# Patient Record
Sex: MALE | HISPANIC OR LATINO | Employment: UNEMPLOYED | URBAN - METROPOLITAN AREA
[De-identification: names, ages, dates, MRNs, and addresses within clinical notes are randomized per-mention and may not be internally consistent; named-entity substitution may affect disease eponyms.]

---

## 2017-03-08 ENCOUNTER — OFFICE VISIT (OUTPATIENT)
Dept: URGENT CARE | Facility: CLINIC | Age: 27
End: 2017-03-08
Payer: COMMERCIAL

## 2017-03-08 VITALS
OXYGEN SATURATION: 96 % | TEMPERATURE: 97.9 F | DIASTOLIC BLOOD PRESSURE: 70 MMHG | RESPIRATION RATE: 18 BRPM | HEART RATE: 92 BPM | WEIGHT: 154 LBS | SYSTOLIC BLOOD PRESSURE: 110 MMHG | BODY MASS INDEX: 22.73 KG/M2

## 2017-03-08 DIAGNOSIS — J02.9 PHARYNGITIS, UNSPECIFIED ETIOLOGY: ICD-10-CM

## 2017-03-08 DIAGNOSIS — J40 BRONCHITIS: ICD-10-CM

## 2017-03-08 DIAGNOSIS — R05.9 COUGH: ICD-10-CM

## 2017-03-08 LAB
INT CON NEG: NORMAL
INT CON POS: NORMAL
S PYO AG THROAT QL: NEGATIVE

## 2017-03-08 PROCEDURE — 99203 OFFICE O/P NEW LOW 30 MIN: CPT | Performed by: NURSE PRACTITIONER

## 2017-03-08 PROCEDURE — 87880 STREP A ASSAY W/OPTIC: CPT | Performed by: NURSE PRACTITIONER

## 2017-03-08 RX ORDER — AZITHROMYCIN 250 MG/1
TABLET, FILM COATED ORAL
Qty: 6 TAB | Refills: 0 | Status: SHIPPED | OUTPATIENT
Start: 2017-03-08 | End: 2017-07-22

## 2017-03-08 RX ORDER — ALBUTEROL SULFATE 90 UG/1
2 AEROSOL, METERED RESPIRATORY (INHALATION) EVERY 6 HOURS PRN
Qty: 8.5 G | Refills: 0 | Status: SHIPPED | OUTPATIENT
Start: 2017-03-08 | End: 2017-07-22

## 2017-03-08 ASSESSMENT — ENCOUNTER SYMPTOMS
CHILLS: 0
SPUTUM PRODUCTION: 1
SHORTNESS OF BREATH: 0
EYES NEGATIVE: 1
HEADACHES: 1
FEVER: 0
CARDIOVASCULAR NEGATIVE: 1
GASTROINTESTINAL NEGATIVE: 1
TROUBLE SWALLOWING: 0
SORE THROAT: 1
MUSCULOSKELETAL NEGATIVE: 1
COUGH: 1
SWOLLEN GLANDS: 1

## 2017-03-08 NOTE — MR AVS SNAPSHOT
Danilo Mckeon Nikki   3/8/2017 6:30 PM   Office Visit   MRN: [id number]    Department:  [facility]   Dept Phone:  [phone]    Description:  Male : 1990   Provider:  Cathey J Hamman, A.P.N.           Reason for Visit     Pharyngitis Almost a week stuffy nose , sinu pressure , few days sorethroat      Allergies as of 3/8/2017     No Known Allergies      You were diagnosed with     Pharyngitis, unspecified etiology   [8606455]       Bronchitis   [[id number]]       Cough   [786.2.ICD-9-CM]         Vital Signs     Blood Pressure Pulse Temperature Respirations Weight Oxygen Saturation    110/70 mmHg 92 36.6 °C (97.9 °F) 18 69.854 kg (154 lb) 96%    Smoking Status                   Never Smoker            Basic Information     Date Of Birth Sex Race Ethnicity Preferred Language    1990 Male  or   Origin (Trinidadian,Maldivian,Salvadorean,Clinton, etc) English      Problem List              ICD-10-CM Priority Class Noted - Resolved    Closed fracture of nasal bone S02.2XXA   2015 - Present      Health Maintenance     Patient has no pending health maintenance at this time      Results     POCT Rapid Strep A      Component    Rapid Strep Screen    NEGATIVE    Internal Control Positive    Valid    Internal Control Negative    Valid                        Current Immunizations     No immunizations on file.      Below and/or attached are the medications your provider expects you to take. Review all of your home medications and newly ordered medications with your provider and/or pharmacist. Follow medication instructions as directed by your provider and/or pharmacist. Please keep your medication list with you and share with your provider. Update the information when medications are discontinued, doses are changed, or new medications (including over-the-counter products) are added; and carry medication information at all times in the event of emergency situations     Allergies:   No Known Allergies          Medications  Valid as of: March 08, 2017 -  7:03 PM    Generic Name Brand Name Tablet Size Instructions for use    Albuterol Sulfate (Aero Soln) albuterol 108 (90 BASE) MCG/ACT Inhale 2 Puffs by mouth every 6 hours as needed for Shortness of Breath.        Azithromycin (Tab) ZITHROMAX 250 MG Take 2 pills by mouth on day one, take 1 pill by mouth on days 2-5        .                 Medicines prescribed today were sent to:     [facility] [address] [place] [address] - [address] [facility] & [address] [address]    Phone: [phone] Fax: [phone]    Open 24 Hours?: No      Medication refill instructions:       If your prescription bottle indicates you have medication refills left, it is not necessary to call your provider’s office. Please contact your pharmacy and they will refill your medication.    If your prescription bottle indicates you do not have any refills left, you may request refills at any time through one of the following ways: The online UReserv system (except Urgent Care), by calling your provider’s office, or by asking your pharmacy to contact your provider’s office with a refill request. Medication refills are processed only during regular business hours and may not be available until the next business day. Your provider may request additional information or to have a follow-up visit with you prior to refilling your medication.   *Please Note: Medication refills are assigned a new Rx number when refilled electronically. Your pharmacy may indicate that no refills were authorized even though a new prescription for the same medication is available at the pharmacy. Please request the medicine by name with the pharmacy before contacting your provider for a refill.           UReserv Access Code: [email or web address]  Expires: 4/7/2017  7:03 PM    UReserv  A secure, online tool to manage your health information     [employer]’s  Firm58® is a secure, online tool that connects you to your personalized health information from the privacy of your home -- day or night - making it very easy for you to manage your healthcare. Once the activation process is completed, you can even access your medical information using the Firm58 norma, which is available for free in the Apple Norma store or Google Play store.     Firm58 provides the following levels of access (as shown below):   My Chart Features   [facility] Doctor [facility]  Specialists [facility]  Urgent  Care Non-[facility]  Primary Care  Doctor   Email your healthcare team securely and privately 24/7 X X X    Manage appointments: schedule your next appointment; view details of past/upcoming appointments X      Request prescription refills. X      View recent personal medical records, including lab and immunizations X X X X   View health record, including health history, allergies, medications X X X X   Read reports about your outpatient visits, procedures, consult and ER notes X X X X   See your discharge summary, which is a recap of your hospital and/or ER visit that includes your diagnosis, lab results, and care plan. X X       How to register for Firm58:  1. Go to  [email or web address].  2. Click on the Sign Up Now box, which takes you to the New Member Sign Up page. You will need to provide the following information:  a. Enter your Firm58 Access Code exactly as it appears at the top of this page. (You will not need to use this code after you’ve completed the sign-up process. If you do not sign up before the expiration date, you must request a new code.)   b. Enter your date of birth.   c. Enter your home email address.   d. Click Submit, and follow the next screen’s instructions.  3. Create a Firm58 ID. This will be your Firm58 login ID and cannot be changed, so think of one that is secure and easy to remember.  4. Create a Firm58 password. You can change your password at any  time.  5. Enter your Password Reset Question and Answer. This can be used at a later time if you forget your password.   6. Enter your e-mail address. This allows you to receive e-mail notifications when new information is available in [employer].  7. Click Sign Up. You can now view your health information.    For assistance activating your [employer] account, call [phone]

## 2017-03-09 NOTE — PROGRESS NOTES
Subjective:      Danilo Jordan is a 26 y.o. male who presents with Pharyngitis    PMH: no history of chronic illness    Social hx: non-smoker    Family hx: not pertinent to today's visit    This patient is a 26-year-old male who presents today with complaint of 4 weeks of intermittent cough, nasal congestion, sinus pressure, and intermittent sore throat. States he has at times had chest tightness and a deep cough. He is not a smoker and does not have any secondhand smoke exposure. He denies any fever, aches, or chills. He denies any shortness of breath.          Pharyngitis   This is a new problem. The current episode started in the past 7 days. Neither side of throat is experiencing more pain than the other. There has been no fever. Associated symptoms include congestion, coughing, headaches and swollen glands. Pertinent negatives include no shortness of breath or trouble swallowing. He has tried nothing for the symptoms. The treatment provided no relief.       Review of Systems   Constitutional: Positive for malaise/fatigue. Negative for fever and chills.   HENT: Positive for congestion and sore throat. Negative for trouble swallowing.    Eyes: Negative.    Respiratory: Positive for cough and sputum production. Negative for shortness of breath.    Cardiovascular: Negative.    Gastrointestinal: Negative.    Genitourinary: Negative.    Musculoskeletal: Negative.    Skin: Negative.    Neurological: Positive for headaches.          Objective:     /70 mmHg  Pulse 92  Temp(Src) 36.6 °C (97.9 °F)  Resp 18  Wt 69.854 kg (154 lb)  SpO2 96%     Physical Exam   Constitutional: He is oriented to person, place, and time. He appears well-developed and well-nourished. No distress.   HENT:   Right Ear: External ear normal.   Left Ear: External ear normal.   Mouth/Throat: No oropharyngeal exudate.   Left TM slightly red   Eyes: Conjunctivae and EOM are normal. Pupils are equal, round, and reactive to  light.   Neck: Normal range of motion. Neck supple.   Cardiovascular: Normal rate and regular rhythm.    Pulmonary/Chest:   Breath sounds harsh   Neurological: He is alert and oriented to person, place, and time.   Skin: Skin is warm and dry. He is not diaphoretic.   Psychiatric: He has a normal mood and affect. His behavior is normal.   Vitals reviewed.              Assessment/Plan:     1. Pharyngitis, unspecified etiology    - POCT Rapid Strep A; negative    2. Bronchitis    - azithromycin (ZITHROMAX) 250 MG Tab; Take 2 pills by mouth on day one, take 1 pill by mouth on days 2-5  Dispense: 6 Tab; Refill: 0  - albuterol 108 (90 BASE) MCG/ACT Aero Soln inhalation aerosol; Inhale 2 Puffs by mouth every 6 hours as needed for Shortness of Breath.  Dispense: 8.5 g; Refill: 0  -Follow up if symptoms persist or worsen    3. Cough    - albuterol 108 (90 BASE) MCG/ACT Aero Soln inhalation aerosol; Inhale 2 Puffs by mouth every 6 hours as needed for Shortness of Breath.  Dispense: 8.5 g; Refill: 0

## 2017-03-21 ENCOUNTER — HOSPITAL ENCOUNTER (OUTPATIENT)
Facility: MEDICAL CENTER | Age: 27
End: 2017-03-21
Attending: NURSE PRACTITIONER
Payer: COMMERCIAL

## 2017-03-21 ENCOUNTER — OFFICE VISIT (OUTPATIENT)
Dept: URGENT CARE | Facility: CLINIC | Age: 27
End: 2017-03-21
Payer: COMMERCIAL

## 2017-03-21 VITALS
RESPIRATION RATE: 20 BRPM | SYSTOLIC BLOOD PRESSURE: 120 MMHG | OXYGEN SATURATION: 96 % | DIASTOLIC BLOOD PRESSURE: 80 MMHG | HEART RATE: 78 BPM | TEMPERATURE: 98.6 F

## 2017-03-21 DIAGNOSIS — Z20.2 EXPOSURE TO STD: ICD-10-CM

## 2017-03-21 PROCEDURE — 87491 CHLMYD TRACH DNA AMP PROBE: CPT

## 2017-03-21 PROCEDURE — 87591 N.GONORRHOEAE DNA AMP PROB: CPT

## 2017-03-21 PROCEDURE — 99000 SPECIMEN HANDLING OFFICE-LAB: CPT | Performed by: NURSE PRACTITIONER

## 2017-03-21 PROCEDURE — 99213 OFFICE O/P EST LOW 20 MIN: CPT | Performed by: NURSE PRACTITIONER

## 2017-03-21 RX ORDER — CEFTRIAXONE SODIUM 250 MG/1
250 INJECTION, POWDER, FOR SOLUTION INTRAMUSCULAR; INTRAVENOUS ONCE
Status: COMPLETED | OUTPATIENT
Start: 2017-03-21 | End: 2017-03-21

## 2017-03-21 RX ORDER — AZITHROMYCIN 500 MG/1
1000 TABLET, FILM COATED ORAL ONCE
Qty: 2 TAB | Refills: 0 | Status: SHIPPED | OUTPATIENT
Start: 2017-03-21 | End: 2017-03-21

## 2017-03-21 RX ADMIN — CEFTRIAXONE SODIUM 250 MG: 250 INJECTION, POWDER, FOR SOLUTION INTRAMUSCULAR; INTRAVENOUS at 19:13

## 2017-03-21 ASSESSMENT — ENCOUNTER SYMPTOMS: CONSTITUTIONAL NEGATIVE: 1

## 2017-03-22 LAB
C TRACH DNA GENITAL QL NAA+PROBE: POSITIVE
N GONORRHOEA DNA GENITAL QL NAA+PROBE: NEGATIVE
SPECIMEN SOURCE: ABNORMAL

## 2017-03-23 ENCOUNTER — TELEPHONE (OUTPATIENT)
Dept: URGENT CARE | Facility: CLINIC | Age: 27
End: 2017-03-23

## 2017-03-23 ENCOUNTER — HOSPITAL ENCOUNTER (OUTPATIENT)
Dept: LAB | Facility: MEDICAL CENTER | Age: 27
End: 2017-03-23
Attending: NURSE PRACTITIONER
Payer: COMMERCIAL

## 2017-03-23 DIAGNOSIS — Z20.2 EXPOSURE TO STD: ICD-10-CM

## 2017-03-23 LAB
HIV 1+2 AB+HIV1 P24 AG SERPL QL IA: NON REACTIVE
TREPONEMA PALLIDUM IGG+IGM AB [PRESENCE] IN SERUM OR PLASMA BY IMMUNOASSAY: NON REACTIVE

## 2017-03-23 PROCEDURE — 87389 HIV-1 AG W/HIV-1&-2 AB AG IA: CPT

## 2017-03-23 PROCEDURE — 86694 HERPES SIMPLEX NES ANTBDY: CPT

## 2017-03-23 PROCEDURE — 36415 COLL VENOUS BLD VENIPUNCTURE: CPT

## 2017-03-23 PROCEDURE — 86780 TREPONEMA PALLIDUM: CPT

## 2017-03-23 NOTE — TELEPHONE ENCOUNTER
Patient notified by phone of positive chlamydia result. States he has not yet taken the zithromax as it was not at the pharmacy. I did verify that it was sent and I requested that the patient check the pharmacy again and notify me if  The RX is not there. Patient states he will do this. No other questions at this time.

## 2017-03-25 LAB
HSV1+2 IGG SER IA-ACNC: >22.4 IV
HSV1+2 IGM SER IA-ACNC: 0.58 IV

## 2017-03-28 ENCOUNTER — TELEPHONE (OUTPATIENT)
Dept: URGENT CARE | Facility: CLINIC | Age: 27
End: 2017-03-28

## 2017-03-28 NOTE — TELEPHONE ENCOUNTER
Pt. Called and would like to speak to you personally he says it is of a personal nature. His cell is [phone]

## 2017-04-02 ENCOUNTER — TELEPHONE (OUTPATIENT)
Dept: URGENT CARE | Facility: CLINIC | Age: 27
End: 2017-04-02

## 2017-04-03 NOTE — TELEPHONE ENCOUNTER
Attempted to notify patient of results by phone. No answer. Left voice message advising patient that I do need to discuss a positive result with him. Advised also that I will attempt to call him again on 4/4/17 with results. Patient is not registered for [employer].

## 2017-04-14 ENCOUNTER — TELEPHONE (OUTPATIENT)
Dept: URGENT CARE | Facility: CLINIC | Age: 27
End: 2017-04-14

## 2017-04-14 NOTE — TELEPHONE ENCOUNTER
Patient return phone call regarding his labs. I discussed all lab results with him. He has no further questions at this time. Patient was advised to call or return to office for any further questions or concerns.

## 2017-05-31 ENCOUNTER — HOSPITAL ENCOUNTER (OUTPATIENT)
Facility: MEDICAL CENTER | Age: 27
End: 2017-05-31
Attending: FAMILY MEDICINE
Payer: COMMERCIAL

## 2017-05-31 ENCOUNTER — OFFICE VISIT (OUTPATIENT)
Dept: URGENT CARE | Facility: CLINIC | Age: 27
End: 2017-05-31
Payer: COMMERCIAL

## 2017-05-31 VITALS
SYSTOLIC BLOOD PRESSURE: 120 MMHG | OXYGEN SATURATION: 98 % | WEIGHT: 155 LBS | RESPIRATION RATE: 20 BRPM | DIASTOLIC BLOOD PRESSURE: 80 MMHG | TEMPERATURE: 98.2 F | HEART RATE: 72 BPM | BODY MASS INDEX: 22.88 KG/M2

## 2017-05-31 DIAGNOSIS — A74.9 CHLAMYDIA: ICD-10-CM

## 2017-05-31 PROCEDURE — 99213 OFFICE O/P EST LOW 20 MIN: CPT | Performed by: FAMILY MEDICINE

## 2017-05-31 PROCEDURE — 87591 N.GONORRHOEAE DNA AMP PROB: CPT

## 2017-05-31 PROCEDURE — 99000 SPECIMEN HANDLING OFFICE-LAB: CPT | Performed by: FAMILY MEDICINE

## 2017-05-31 PROCEDURE — 87491 CHLMYD TRACH DNA AMP PROBE: CPT

## 2017-05-31 ASSESSMENT — ENCOUNTER SYMPTOMS
FLANK PAIN: 0
FEVER: 0

## 2017-05-31 NOTE — MR AVS SNAPSHOT
Danilo Mckeon Nikki   2017 6:45 PM   Office Visit   MRN: [id number]    Department:  [facility]   Dept Phone:  [phone]    Description:  Male : 1990   Provider:  Leonides Silva M.D.           Reason for Visit     Exposure to STD           Allergies as of 2017     No Known Allergies      You were diagnosed with     Chlamydia   [230269]         Vital Signs     Blood Pressure Pulse Temperature Respirations Weight Oxygen Saturation    120/80 mmHg 72 36.8 °C (98.2 °F) 20 70.308 kg (155 lb) 98%    Smoking Status                   Never Smoker            Basic Information     Date Of Birth Sex Race Ethnicity Preferred Language    1990 Male  or   Origin (Malay,Malawian,Trinidadian,Clinton, etc) English      Problem List              ICD-10-CM Priority Class Noted - Resolved    Closed fracture of nasal bone S02.2XXA   2015 - Present      Health Maintenance        Date Due Completion Dates    IMM HEP B VACCINE (1 of 3 - Primary Series) 1992 ---    IMM HEP A VACCINE (1 of 2 - Standard Series) 1993 ---    IMM HPV VACCINE (1 of 3 - Male 3 Dose Series) 2003 ---    IMM VARICELLA (CHICKENPOX) VACCINE (1 of 2 - 2 Dose Adolescent Series) 2005 ---    IMM DTaP/Tdap/Td Vaccine (1 - Tdap) 2011 ---            Current Immunizations     No immunizations on file.      Below and/or attached are the medications your provider expects you to take. Review all of your home medications and newly ordered medications with your provider and/or pharmacist. Follow medication instructions as directed by your provider and/or pharmacist. Please keep your medication list with you and share with your provider. Update the information when medications are discontinued, doses are changed, or new medications (including over-the-counter products) are added; and carry medication information at all times in the event of emergency situations     Allergies:  No  Known Allergies          Medications  Valid as of: May 31, 2017 -  7:26 PM    Generic Name Brand Name Tablet Size Instructions for use    Albuterol Sulfate (Aero Soln) albuterol 108 (90 BASE) MCG/ACT Inhale 2 Puffs by mouth every 6 hours as needed for Shortness of Breath.        Azithromycin (Tab) ZITHROMAX 250 MG Take 2 pills by mouth on day one, take 1 pill by mouth on days 2-5        .                 Medicines prescribed today were sent to:     [facility] [address] [place] [address], [place] - [address] [facility] & [address] [place] [address]    Phone: [phone] Fax: [phone]    Open 24 Hours?: No      Medication refill instructions:       If your prescription bottle indicates you have medication refills left, it is not necessary to call your provider’s office. Please contact your pharmacy and they will refill your medication.    If your prescription bottle indicates you do not have any refills left, you may request refills at any time through one of the following ways: The online 265 [employer] system (except Urgent Care), by calling your provider’s office, or by asking your pharmacy to contact your provider’s office with a refill request. Medication refills are processed only during regular business hours and may not be available until the next business day. Your provider may request additional information or to have a follow-up visit with you prior to refilling your medication.   *Please Note: Medication refills are assigned a new Rx number when refilled electronically. Your pharmacy may indicate that no refills were authorized even though a new prescription for the same medication is available at the pharmacy. Please request the medicine by name with the pharmacy before contacting your provider for a refill.        Your To Do List     Future Labs/Procedures Complete By Expires    CHLAMYDIA/GC PCR URINE OR SWAB  As directed 5/31/2018         265 Network Access Code:  [email or web address]  Expires: 6/28/2017  4:04 AM    Skai  A secure, online tool to manage your health information     [employer]’s Skai® is a secure, online tool that connects you to your personalized health information from the privacy of your home -- day or night - making it very easy for you to manage your healthcare. Once the activation process is completed, you can even access your medical information using the Skai norma, which is available for free in the Apple Norma store or Google Play store.     Skai provides the following levels of access (as shown below):   My Chart Features   [facility] Primary Care Doctor [facility]  Specialists [facility]  Urgent  Care Non-[facility]  Primary Care  Doctor   Email your healthcare team securely and privately 24/7 X X X    Manage appointments: schedule your next appointment; view details of past/upcoming appointments X      Request prescription refills. X      View recent personal medical records, including lab and immunizations X X X X   View health record, including health history, allergies, medications X X X X   Read reports about your outpatient visits, procedures, consult and ER notes X X X X   See your discharge summary, which is a recap of your hospital and/or ER visit that includes your diagnosis, lab results, and care plan. X X       How to register for Skai:  1. Go to  [email or web address].  2. Click on the Sign Up Now box, which takes you to the New Member Sign Up page. You will need to provide the following information:  a. Enter your Skai Access Code exactly as it appears at the top of this page. (You will not need to use this code after you’ve completed the sign-up process. If you do not sign up before the expiration date, you must request a new code.)   b. Enter your date of birth.   c. Enter your home email address.   d. Click Submit, and follow the next screen’s instructions.  3. Create a Skai ID. This will be your Skai login ID and cannot be  changed, so think of one that is secure and easy to remember.  4. Create a Placed password. You can change your password at any time.  5. Enter your Password Reset Question and Answer. This can be used at a later time if you forget your password.   6. Enter your e-mail address. This allows you to receive e-mail notifications when new information is available in Placed.  7. Click Sign Up. You can now view your health information.    For assistance activating your Placed account, call [phone]

## 2017-06-01 LAB
C TRACH DNA SPEC QL NAA+PROBE: POSITIVE
N GONORRHOEA DNA SPEC QL NAA+PROBE: NEGATIVE
SPECIMEN SOURCE: ABNORMAL

## 2017-06-01 NOTE — PROGRESS NOTES
Subjective:      Danilo Jordan is a 26 y.o. male who presents with Exposure to STD            Exposure to STD  Pertinent negatives include no fever.         Here for f/u.      Was diagnosed with chlamydia in march.   He was treated appropriately.   Denies any symptoms, currently.   Just wants to ensure that chlamydia is gone    Past medical history was unremarkable and not pertinent to current issue  Social hx - denies tobacco, alcohol, drug use         Review of Systems   Constitutional: Negative for fever.   Genitourinary: Negative for dysuria, urgency, frequency, hematuria and flank pain.   All other systems reviewed and are negative.         Objective:     /80 mmHg  Pulse 72  Temp(Src) 36.8 °C (98.2 °F)  Resp 20  Wt 70.308 kg (155 lb)  SpO2 98%     Physical Exam   Constitutional: He is oriented to person, place, and time. He appears well-developed. No distress.   HENT:   Head: Normocephalic and atraumatic.   Eyes: Conjunctivae are normal.   Cardiovascular: Normal rate.    Pulmonary/Chest: Effort normal.   Neurological: He is alert and oriented to person, place, and time.   Skin: Skin is warm. He is not diaphoretic. No erythema.   Psychiatric: His behavior is normal.   Nursing note and vitals reviewed.              Assessment/Plan:     1. Chlamydia  Culture ordered to ensure resolution      - CHLAMYDIA/GC PCR URINE OR SWAB; Future

## 2017-06-02 ENCOUNTER — NON-PROVIDER VISIT (OUTPATIENT)
Dept: URGENT CARE | Facility: PHYSICIAN GROUP | Age: 27
End: 2017-06-02

## 2017-06-02 DIAGNOSIS — Z02.1 PRE-EMPLOYMENT DRUG SCREENING: ICD-10-CM

## 2017-06-02 LAB
AMP AMPHETAMINE: NORMAL
COC COCAINE: NORMAL
INT CON NEG: NEGATIVE
INT CON POS: POSITIVE
MET METHAMPHETAMINES: NORMAL
OPI OPIATES: NORMAL
PCP PHENCYCLIDINE: NORMAL
POC DRUG COMMENT 753798-OCCUPATIONAL HEALTH: NORMAL
THC: NORMAL

## 2017-06-02 PROCEDURE — 80305 DRUG TEST PRSMV DIR OPT OBS: CPT | Performed by: FAMILY MEDICINE

## 2017-06-02 NOTE — PROGRESS NOTES
Danilo Jordan is a 26 y.o. male here for a non-provider visit for UDS    If abnormal was an in office provider notified today (if so, indicate provider)? No  Routed to PCP? No

## 2017-06-02 NOTE — MR AVS SNAPSHOT
Danilo RiosNarcisa   2017 3:30 PM   Non-Provider Visit   MRN: [id number]    Department:  [facility]   Dept Phone:  [phone]    Description:  Male : 1990   Provider:  [facility]           Reason for Visit     Drug / Alcohol Assessment           Allergies as of 2017     No Known Allergies      You were diagnosed with     Pre-employment drug screening   [[id number]]         Vital Signs     Smoking Status                   Never Smoker            Basic Information     Date Of Birth Sex Race Ethnicity Preferred Language    1990 Male  or   Origin (Estonian,Czech,Romanian,Paraguayan, etc) English      Problem List              ICD-10-CM Priority Class Noted - Resolved    Closed fracture of nasal bone S02.2XXA   2015 - Present      Health Maintenance        Date Due Completion Dates    IMM HEP B VACCINE (1 of 3 - Primary Series) 1992 ---    IMM HEP A VACCINE (1 of 2 - Standard Series) 1993 ---    IMM HPV VACCINE (1 of 3 - Male 3 Dose Series) 2003 ---    IMM VARICELLA (CHICKENPOX) VACCINE (1 of 2 - 2 Dose Adolescent Series) 2005 ---    IMM DTaP/Tdap/Td Vaccine (1 - Tdap) 2011 ---            Results     POCT 6 Panel Urine Drug Screen      Component    AMPHETAMINE    POC THC    COCAINE    OPIATES    PHENCYCLIDINE    METHAMPHETAMINES    POC Urine Drug Screen Comment    NEG    Internal Control Positive    Positive    Internal Control Negative    Negative                        Current Immunizations     No immunizations on file.      Below and/or attached are the medications your provider expects you to take. Review all of your home medications and newly ordered medications with your provider and/or pharmacist. Follow medication instructions as directed by your provider and/or pharmacist. Please keep your medication list with you and share with your provider. Update the information when medications are discontinued, doses are  changed, or new medications (including over-the-counter products) are added; and carry medication information at all times in the event of emergency situations     Allergies:  No Known Allergies          Medications  Valid as of: June 02, 2017 -  6:08 PM    Generic Name Brand Name Tablet Size Instructions for use    Albuterol Sulfate (Aero Soln) albuterol 108 (90 BASE) MCG/ACT Inhale 2 Puffs by mouth every 6 hours as needed for Shortness of Breath.        Azithromycin (Tab) ZITHROMAX 250 MG Take 2 pills by mouth on day one, take 1 pill by mouth on days 2-5        .                 Medicines prescribed today were sent to:     [facility] [address] [place] [address], [place] - [address] [facility] [address] [facility]    [address] [address]    Phone: [phone] Fax: [phone]    Open 24 Hours?: No      Medication refill instructions:       If your prescription bottle indicates you have medication refills left, it is not necessary to call your provider’s office. Please contact your pharmacy and they will refill your medication.    If your prescription bottle indicates you do not have any refills left, you may request refills at any time through one of the following ways: The online WeVideo system (except Urgent Care), by calling your provider’s office, or by asking your pharmacy to contact your provider’s office with a refill request. Medication refills are processed only during regular business hours and may not be available until the next business day. Your provider may request additional information or to have a follow-up visit with you prior to refilling your medication.   *Please Note: Medication refills are assigned a new Rx number when refilled electronically. Your pharmacy may indicate that no refills were authorized even though a new prescription for the same medication is available at the pharmacy. Please request the medicine by name with the pharmacy before contacting your provider for a  refill.           TearSolutions Access Code: [email or web address]  Expires: 6/28/2017  4:04 AM    TearSolutions  A secure, online tool to manage your health information     [employer] TearSolutions® is a secure, online tool that connects you to your personalized health information from the privacy of your home -- day or night - making it very easy for you to manage your healthcare. Once the activation process is completed, you can even access your medical information using the TearSolutions norma, which is available for free in the Apple Norma store or Google Play store.     TearSolutions provides the following levels of access (as shown below):   My Chart Features   [facility] Primary Care Doctor [facility]  Specialists [facility]  Urgent  Care Non-[facility]  Primary Care  Doctor   Email your healthcare team securely and privately 24/7 X X X    Manage appointments: schedule your next appointment; view details of past/upcoming appointments X      Request prescription refills. X      View recent personal medical records, including lab and immunizations X X X X   View health record, including health history, allergies, medications X X X X   Read reports about your outpatient visits, procedures, consult and ER notes X X X X   See your discharge summary, which is a recap of your hospital and/or ER visit that includes your diagnosis, lab results, and care plan. X X       How to register for TearSolutions:  1. Go to  [email or web address].  2. Click on the Sign Up Now box, which takes you to the New Member Sign Up page. You will need to provide the following information:  a. Enter your TearSolutions Access Code exactly as it appears at the top of this page. (You will not need to use this code after you’ve completed the sign-up process. If you do not sign up before the expiration date, you must request a new code.)   b. Enter your date of birth.   c. Enter your home email address.   d. Click Submit, and follow the next screen’s instructions.  3. Create a TearSolutions ID. This will  be your CrowdWorks login ID and cannot be changed, so think of one that is secure and easy to remember.  4. Create a CrowdWorks password. You can change your password at any time.  5. Enter your Password Reset Question and Answer. This can be used at a later time if you forget your password.   6. Enter your e-mail address. This allows you to receive e-mail notifications when new information is available in CrowdWorks.  7. Click Sign Up. You can now view your health information.    For assistance activating your CrowdWorks account, call [phone]

## 2017-06-05 DIAGNOSIS — A74.9 CHLAMYDIA: ICD-10-CM

## 2017-06-05 RX ORDER — AZITHROMYCIN 500 MG/1
500 TABLET, FILM COATED ORAL ONCE
Qty: 1 TAB | Refills: 0 | Status: SHIPPED | OUTPATIENT
Start: 2017-06-05 | End: 2017-06-05

## 2017-07-22 ENCOUNTER — HOSPITAL ENCOUNTER (EMERGENCY)
Facility: MEDICAL CENTER | Age: 27
End: 2017-07-22
Attending: EMERGENCY MEDICINE
Payer: COMMERCIAL

## 2017-07-22 VITALS
HEART RATE: 74 BPM | BODY MASS INDEX: 22.33 KG/M2 | SYSTOLIC BLOOD PRESSURE: 120 MMHG | HEIGHT: 69 IN | DIASTOLIC BLOOD PRESSURE: 81 MMHG | RESPIRATION RATE: 16 BRPM | WEIGHT: 150.79 LBS | TEMPERATURE: 98.5 F | OXYGEN SATURATION: 96 %

## 2017-07-22 DIAGNOSIS — A74.9 CHLAMYDIA: ICD-10-CM

## 2017-07-22 LAB — TREPONEMA PALLIDUM IGG+IGM AB [PRESENCE] IN SERUM OR PLASMA BY IMMUNOASSAY: NON REACTIVE

## 2017-07-22 PROCEDURE — 99284 EMERGENCY DEPT VISIT MOD MDM: CPT

## 2017-07-22 PROCEDURE — 86780 TREPONEMA PALLIDUM: CPT

## 2017-07-22 PROCEDURE — 87535 HIV-1 PROBE&REVERSE TRNSCRPJ: CPT

## 2017-07-22 PROCEDURE — A9270 NON-COVERED ITEM OR SERVICE: HCPCS | Performed by: EMERGENCY MEDICINE

## 2017-07-22 PROCEDURE — 700102 HCHG RX REV CODE 250 W/ 637 OVERRIDE(OP): Performed by: EMERGENCY MEDICINE

## 2017-07-22 RX ORDER — AZITHROMYCIN 250 MG/1
1000 TABLET, FILM COATED ORAL ONCE
Status: COMPLETED | OUTPATIENT
Start: 2017-07-22 | End: 2017-07-22

## 2017-07-22 RX ADMIN — AZITHROMYCIN 1000 MG: 250 TABLET, FILM COATED ORAL at 12:22

## 2017-07-22 ASSESSMENT — PAIN SCALES - GENERAL: PAINLEVEL_OUTOF10: 4

## 2017-07-22 NOTE — ED NOTES
"Chief Complaint   Patient presents with   • Diarrhea     x 3 weeks   • Abdominal Pain     x 3 weeks     /81 mmHg  Pulse 74  Temp(Src) 36.9 °C (98.5 °F)  Resp 16  Ht 1.753 m (5' 9\")  Wt 68.4 kg (150 lb 12.7 oz)  BMI 22.26 kg/m2  SpO2 96%    "

## 2017-07-22 NOTE — ED AVS SNAPSHOT
7/22/2017    Danilo Jordan  [address]  [place] [address]    Dear Danilo Mckeon:    [facility] wants to ensure your discharge home is safe and you or your loved ones have had all of your questions answered regarding your care after you leave the hospital.    Below is a list of resources and contact information should you have any questions regarding your hospital stay, follow-up instructions, or active medical symptoms.    Questions or Concerns Regarding… Contact   Medical Questions Related to Your Discharge  (7 days a week, 8am-5pm) Contact a Nurse Care Coordinator   [phone]   Medical Questions Not Related to Your Discharge  (24 hours a day / 7 days a week)  Contact the Nurse Health Line   [phone]    Medications or Discharge Instructions Refer to your discharge packet   or contact your [facility] Primary Care Provider   [phone]   Follow-up Appointment(s) Schedule your appointment via [employer]   or contact Scheduling [phone]   Billing Review your statement via [employer]  or contact Billing [phone]   Medical Records Review your records via [employer]   or contact Medical Records [phone]     You may receive a telephone call within two days of discharge. This call is to make certain you understand your discharge instructions and have the opportunity to have any questions answered. You can also easily access your medical information, test results and upcoming appointments via the [employer] free online health management tool. You can learn more and sign up at [employer]. For assistance setting up your [employer] account, please call [phone].    Once again, we want to ensure your discharge home is safe and that you have a clear understanding of any next steps in your care. If you have any questions or concerns, please do not hesitate to contact us, we are here for you. Thank you for choosing [facility] for your healthcare needs.    Sincerely,    Your [facility] Healthcare Team

## 2017-07-22 NOTE — ED AVS SNAPSHOT
BioVentrix Access Code: [email or web address]  Expires: 7/27/2017  4:08 AM    Your email address is not on file at [facility].  Email Addresses are required for you to sign up for BioVentrix, please contact [phone] to verify your personal information and to provide your email address prior to attempting to register for BioVentrix.    Danilo Jordan  [address]  [place] [address]    BioVentrix  A secure, online tool to manage your health information     [employer]’s BioVentrix® is a secure, online tool that connects you to your personalized health information from the privacy of your home -- day or night - making it very easy for you to manage your healthcare. Once the activation process is completed, you can even access your medical information using the BioVentrix norma, which is available for free in the Apple Norma store or Google Play store.     To learn more about BioVentrix, visit [email or web address]    There are two levels of access available (as shown below):   My Chart Features  [facility] Primary Care Doctor [facility]  Specialists [facility]  Urgent  Care Non-[facility] Primary Care Doctor   Email your healthcare team securely and privately 24/7 X X X    Manage appointments: schedule your next appointment; view details of past/upcoming appointments X      Request prescription refills. X      View recent personal medical records, including lab and immunizations X X X X   View health record, including health history, allergies, medications X X X X   Read reports about your outpatient visits, procedures, consult and ER notes X X X X   See your discharge summary, which is a recap of your hospital and/or ER visit that includes your diagnosis, lab results, and care plan X X  X     How to register for BioVentrix:  Once your e-mail address has been verified, follow the following steps to sign up for [email or web address].     1. Go to  [email or web address]  2. Click on the Sign Up Now box, which takes you to the New Member Sign Up  page. You will need to provide the following information:  a. Enter your [employer] Access Code exactly as it appears at the top of this page. (You will not need to use this code after you’ve completed the sign-up process. If you do not sign up before the expiration date, you must request a new code.)   b. Enter your date of birth.   c. Enter your home email address.   d. Click Submit, and follow the next screen’s instructions.  3. Create a [employer] ID. This will be your [employer] login ID and cannot be changed, so think of one that is secure and easy to remember.  4. Create a [employer] password. You can change your password at any time.  5. Enter your Password Reset Question and Answer. This can be used at a later time if you forget your password.   6. Enter your e-mail address. This allows you to receive e-mail notifications when new information is available in [employer].  7. Click Sign Up. You can now view your health information.    For assistance activating your [employer] account, call [phone]

## 2017-07-22 NOTE — ED AVS SNAPSHOT
Home Care Instructions                                                                                                                Danilo Jordan   MRN: [id number]    Department:  [facility], Emergency Dept   Date of Visit:  7/22/2017            [facility] Emergency Dept    [address]    [place] [address]    Phone:  [phone]      You were seen by     Hillary Naik M.D.      Your Diagnosis Was     Chlamydia     A74.9       These are the medications you received during your hospitalization from 07/22/2017 1055 to 07/22/2017 1230     Date/Time Order Dose Route Action    07/22/2017 1222 azithromycin (ZITHROMAX) tablet 1,000 mg 1,000 mg Oral Given      Follow-up Information     1. Follow up with [facility], Emergency Dept.    Specialty:  Emergency Medicine    Why:  Return for worsening pain, fever, vomiting or other concerns    Contact information    [address] [address] [phone]        2. Follow up with [facility].    Why:  for STD testing as needed    Contact information    [address]   [place] [address]  [phone]        Medication Information     Review all of your home medications and newly ordered medications with your primary doctor and/or pharmacist as soon as possible. Follow medication instructions as directed by your doctor and/or pharmacist.     Please keep your complete medication list with you and share with your physician. Update the information when medications are discontinued, doses are changed, or new medications (including over-the-counter products) are added; and carry medication information at all times in the event of emergency situations.               Medication List      Notice     You have not been prescribed any medications.            Procedures and tests performed during your visit     HIV-1 (RNA AND DNA) BY PCR, QUAL    T.PALLIDUM AB EIA         Discharge Instructions       Chlamydia, Male  Chlamydia is an infection. It is spread through sexual contact. Chlamydia can be in different areas of the body. These areas include the urethra, throat, or rectum. It is important to treat chlamydia as soon as possible. It can damage other organs.   CAUSES   Chlamydia is caused by bacteria. It is a sexually transmitted disease. This means that it is passed from an infected partner during intimate contact. This contact could be with the genitals, mouth, or rectal area.   SIGNS AND SYMPTOMS   There may not be any symptoms. This is often the case early in the infection. If there are symptoms, they are usually mild and may only be noticeable in the morning. Symptoms you may notice include:   · Burning with urination.  · Pain or swelling in the testicles.  · Watery mucus-like discharge from the penis.  · Long-standing (chronic) pelvic pain after frequent infections.  · Pain, swelling, or itching around the anus.  · A sore throat.  · Itching, burning, or redness in the eyes, or discharge from the eyes.  DIAGNOSIS   To diagnose this infection, your health care provider will do a pelvic exam. A sample of urine or a swab from the rectum may be taken for testing.   TREATMENT   Chlamydia is treated with antibiotic medicines. Your health care provider may test you for infection again 3 months after treatment.  HOME CARE INSTRUCTIONS  · Take your antibiotic medicine as directed by your health care provider. Finish the antibiotic even if you start to feel better. Incomplete treatment will put you at risk for not being able to have children (sterility).    · Take medicines only as directed by your health care provider.    · Rest.    · Inform any sexual partners about your infection. Even if they are symptom free or have a negative culture or evaluation, they should be treated for the condition.    · Do not have sex (intercourse) until treatment is completed and your health care  provider says it is okay.    · Keep all follow-up visits as directed by your health care provider.    · Not all test results are available during your visit. If your test results are not back during the visit, make an appointment with your health care provider to find out the results. Do not assume everything is normal if you have not heard from your health care provider or the medical facility. It is your responsibility to get your test results.  SEEK MEDICAL CARE IF:  · You develop new joint pain.  · You have a fever.  SEEK IMMEDIATE MEDICAL CARE IF:   · Your pain increases.    · You have abnormal discharge.    · You have pain during intercourse.  MAKE SURE YOU:   · Understand these instructions.  · Will watch your condition.  · Will get help right away if you are not doing well or get worse.     This information is not intended to replace advice given to you by your health care provider. Make sure you discuss any questions you have with your health care provider.     Document Released: 12/18/2006 Document Revised: 01/08/2016 Document Reviewed: 06/26/2014  [employer] Interactive Patient Education ©2016 [employer]            Patient Information     Patient Information    Following emergency treatment: all patient requiring follow-up care must return either to a private physician or a clinic if your condition worsens before you are able to obtain further medical attention, please return to the emergency room.     Billing Information    At [facility], we work to make the billing process streamlined for our patients.  Our Representatives are here to answer any questions you may have regarding your hospital bill.  If you have insurance coverage and have supplied your insurance information to us, we will submit a claim to your insurer on your behalf.  Should you have any questions regarding your bill, we can be reached online or by phone as follows:  Online: You are able pay your bills online or live chat with our  representatives about any billing questions you may have. We are here to help Monday - Friday from 8:00am to 7:30pm and 9:00am - 12:00pm on Saturdays.  Please visit [email or web address]  for more information.   Phone:  [phone] or [phone]    Please note that your emergency physician, surgeon, pathologist, radiologist, anesthesiologist, and other specialists are not employed by [facility] and will therefore bill separately for their services.  Please contact them directly for any questions concerning their bills at the numbers below:     Emergency Physician Services:  [phone]  [facility]:  [phone]  [facility]:  [phone]  [facility]:  [phone]    1. Your final bill may vary from the amount quoted upon discharge if all procedures are not complete at that time, or if your doctor has additional procedures of which we are not aware. You will receive an additional bill if you return to the Emergency Department at [facility] for suture removal regardless of the facility of which the sutures were placed.     2. Please arrange for settlement of this account at the emergency registration.    3. All self-pay accounts are due in full at the time of treatment.  If you are unable to meet this obligation then payment is expected within 4-5 days.     4. If you have had radiology studies (CT, X-ray, Ultrasound, MRI), you have received a preliminary result during your emergency department visit. Please contact the radiology department [phone] to receive a copy of your final result. Please discuss the Final result with your primary physician or with the follow up physician provided.     Crisis Hotline:  [place] Crisis Hotline:  0-845-GJFTFUB or 1-940.252.7132  [place] Crisis Hotline:    1-490.918.3258 or 628-703-5552         ED Discharge Follow Up Questions    1. In order to provide you with very good care, we would  like to follow up with a phone call in the next few days.  May we have your permission to contact you?     YES /  NO    2. What is the best phone number to call you? (       )_____-__________    3. What is the best time to call you?      Morning  /  Afternoon  /  Evening                   Patient Signature:  ____________________________________________________________    Date:  ____________________________________________________________      Your appointments     Aug 09, 2017  4:40 PM   New Patient with GAVIN Benoit   [facility])    [address]  [place] [address]   [phone]           Please bring Photo ID, Insurance Cards, All Medication Bottles and copies of any legal documents (such as Living Will, Power of ) If speaking a language besides English please bring an adult . Please arrive 30 minutes prior for check in and registration. You will be receiving a confirmation call a few days before your appointment from our automated call confirmation system.

## 2017-07-22 NOTE — ED PROVIDER NOTES
"ED Provider Note    CHIEF COMPLAINT  Chief Complaint   Patient presents with   • Diarrhea     x 3 weeks   • Abdominal Pain     x 3 weeks       HPI  Danilo Jordan is a 26 y.o. male who presents with 2 concerns. When he was seen a little over a month ago to urgent care. He was told he had chlamydia and was only given 500 mg of azithromycin. He thought he was supposed to take more than that so he never did. He denies having any discharge or difficulty urinating. He also has been having intermittent abdominal pain but describes as cramping that prompted him to go to the bathroom. He has been having 3-4 episodes of watery stools for the last 3 weeks. It is mostly concerned about the untreated chlamydia and is worried about any other sexually transmitted diseases. He is requesting \"blood tests\" to check for this. He denies any fevers.      REVIEW OF SYSTEMS  Positive for intermittent abdominal pain diarrhea, negative for fevers.     PAST MEDICAL HISTORY       SOCIAL HISTORY  Social History     Social History Main Topics   • Smoking status: Never Smoker    • Smokeless tobacco: Never Used   • Alcohol Use: Yes   • Drug Use: No   • Sexual Activity: Not on file       SURGICAL HISTORY   has past surgical history that includes appendectomy and nasal fracture reduction closed (N/A, 6/9/2015).    CURRENT MEDICATIONS  Home Medications     Reviewed by Young Louis (Pharmacy Tech) on 07/22/17 at 1206  Med List Status: Complete    Medication Last Dose Status          Patient Arun Taking any Medications                        ALLERGIES  No Known Allergies    PHYSICAL EXAM  VITAL SIGNS: /81 mmHg  Pulse 74  Temp(Src) 36.9 °C (98.5 °F)  Resp 16  Ht 1.753 m (5' 9\")  Wt 68.4 kg (150 lb 12.7 oz)  BMI 22.26 kg/m2  SpO2 96%  Constitutional: Alert in no apparent distress.  HENT: Normocephalic, Atraumatic, Bilateral external ears normal. Nose normal.   Heart: Regular rate and rythm, no murmurs.    Lungs: " Clear to auscultation bilaterally.  Abdomen: Soft nontender nondistended  Skin: Warm, Dry, No erythema, No rash.   Neurologic: Alert, Grossly non-focal.   Psychiatric: Affect normal, Judgment normal, Mood normal, Appears appropriate and not intoxicated.   Extremities: Intact distal pulses, No edema, No tenderness    DIAGNOSTIC STUDIES / PROCEDURES  Results for orders placed or performed during the hospital encounter of 07/22/17   T.PALLIDUM AB EIA   Result Value Ref Range    Syphilis, Treponemal Qual Non Reactive Non Reactive         COURSE & MEDICAL DECISION MAKING  Pertinent Labs & Imaging studies reviewed. (See chart for details)  This is a 26-year-old presents primarily for concern of an untreated sexual H Couch disease. He is currently asymptomatic. Given he had a positive urine test for this and has not been treated he will be given a gram of azithromycin. I will also send off an HIV and syphilis test. He will be referred to primary care. He was instructed to create a food journal for his abdominal pain. He is nontender abdomen on examination do not think he has any symptoms concerning for appendicitis or another surgical emergency.     The patient will return for new or worsening symptoms and is stable at the time of discharge. Patient was given return precautions. Patient and/or family member verbalizes understanding and will comply.    DISPOSITION:  Patient will be discharged home in stable condition.    FOLLOW UP:  [facility], Emergency Dept  [id number]  [facility] [id number] [phone]    Return for worsening pain, fever, vomiting or other concerns    [facility] [place]  [address] [facility] [address]  [phone]      for STD testing as needed        OUTPATIENT MEDICATIONS:  There are no discharge medications for this patient.        FINAL IMPRESSION  1. Chlamydia        2.   3.     This dictation has been creating using voice recognition software. The  accuracy of the dictation is limited the abilities of the software.  I expect there may be some errors of grammar and possibly content. I made every attempt to manually correct the errors within my dictation. However errors related to this voice recognition software may still exist and should be interpreted within the appropriate context.        The note accurately reflects work and decisions made by me.  Hillary Naik  7/22/2017  4:39 PM

## 2017-07-26 LAB — HIV 1 PRO DNA BLD QL NAA+PROBE: NOT DETECTED

## 2017-08-09 ENCOUNTER — OFFICE VISIT (OUTPATIENT)
Dept: MEDICAL GROUP | Facility: MEDICAL CENTER | Age: 27
End: 2017-08-09
Payer: COMMERCIAL

## 2017-08-09 VITALS
WEIGHT: 150 LBS | RESPIRATION RATE: 16 BRPM | TEMPERATURE: 98.2 F | DIASTOLIC BLOOD PRESSURE: 62 MMHG | SYSTOLIC BLOOD PRESSURE: 112 MMHG | HEART RATE: 65 BPM | HEIGHT: 69 IN | OXYGEN SATURATION: 98 % | BODY MASS INDEX: 22.22 KG/M2

## 2017-08-09 DIAGNOSIS — Z86.19 HISTORY OF CHLAMYDIA: ICD-10-CM

## 2017-08-09 DIAGNOSIS — Z00.00 ANNUAL PHYSICAL EXAM: ICD-10-CM

## 2017-08-09 PROCEDURE — 99395 PREV VISIT EST AGE 18-39: CPT | Performed by: NURSE PRACTITIONER

## 2017-08-09 ASSESSMENT — PATIENT HEALTH QUESTIONNAIRE - PHQ9: CLINICAL INTERPRETATION OF PHQ2 SCORE: 0

## 2017-08-09 NOTE — MR AVS SNAPSHOT
"        Danilo RiosNarcisa   2017 4:40 PM   Office Visit   MRN: [id number]    Department:  [facility]   Dept Phone:  [phone]    Description:  Male : 1990   Provider:  GAVIN Benoit           Reason for Visit     Establish Care           Allergies as of 2017     No Known Allergies      You were diagnosed with     Annual physical exam   [710463]       History of chlamydia   [270426]         Vital Signs     Blood Pressure Pulse Temperature Respirations Height Weight    112/62 mmHg 65 36.8 °C (98.2 °F) 16 1.753 m (5' 9\") 68.04 kg (150 lb)    Body Mass Index Oxygen Saturation Smoking Status             22.14 kg/m2 98% Never Smoker          Basic Information     Date Of Birth Sex Race Ethnicity Preferred Language    1990 Male  or   Origin (Sao Tomean,Bangladeshi,Zambian,Micronesian, etc) English      Problem List              ICD-10-CM Priority Class Noted - Resolved    Closed fracture of nasal bone S02.2XXA   2015 - Present    History of chlamydia Z86.19   2017 - Present      Health Maintenance        Date Due Completion Dates    IMM HPV VACCINE (1 of 3 - Male 3 Dose Series) 2003 ---    IMM INFLUENZA (1) 2017 ---    IMM DTaP/Tdap/Td Vaccine (3 - Td) 2/3/2020 2/3/2010, 6/15/2005, 1994, 1993, 1993, 1993            Current Immunizations     DTP 1994, 1993, 1993, 1993    Hepatitis A Vaccine, Ped/Adol 2004, 10/16/2002    Hepatitis B Vaccine Non-Recombivax (Ped/Adol) 10/4/1996, 1994, 1993    Hib Vaccine (Prp-d) Historical Data 1999, 1994, 1993, 1993    MMR Vaccine 1999, 1994    Meningococcal Conjugate Vaccine MCV4 (Menactra) 2009    OPV - Historical Data 1999, 1994, 1993, 1993    TD Vaccine 6/15/2005    Tdap Vaccine 2/3/2010    Varicella Vaccine Live 2006, 2000      Below and/or attached are the medications your provider " expects you to take. Review all of your home medications and newly ordered medications with your provider and/or pharmacist. Follow medication instructions as directed by your provider and/or pharmacist. Please keep your medication list with you and share with your provider. Update the information when medications are discontinued, doses are changed, or new medications (including over-the-counter products) are added; and carry medication information at all times in the event of emergency situations     Allergies:  No Known Allergies          Medications  Valid as of: August 09, 2017 -  5:13 PM    Generic Name Brand Name Tablet Size Instructions for use    .                 Medicines prescribed today were sent to:     [id number] [id number]  [facility] [place] - [address] [facility]    [address] [place] [address]    Phone: [phone] Fax: [phone]    Open 24 Hours?: No      Medication refill instructions:       If your prescription bottle indicates you have medication refills left, it is not necessary to call your provider’s office. Please contact your pharmacy and they will refill your medication.    If your prescription bottle indicates you do not have any refills left, you may request refills at any time through one of the following ways: The online [employer] You system (except Urgent Care), by calling your provider’s office, or by asking your pharmacy to contact your provider’s office with a refill request. Medication refills are processed only during regular business hours and may not be available until the next business day. Your provider may request additional information or to have a follow-up visit with you prior to refilling your medication.   *Please Note: Medication refills are assigned a new Rx number when refilled electronically. Your pharmacy may indicate that no refills were authorized even though a new prescription for the same medication is available at the pharmacy.  Please request the medicine by name with the pharmacy before contacting your provider for a refill.        Your To Do List     Future Labs/Procedures Complete By Expires    CHLAMYDIA/GC PCR URINE OR SWAB  As directed 8/10/2018         Pixelligent Access Code: [email or web address]  Expires: 8/25/2017  4:12 AM    Pixelligent  A secure, online tool to manage your health information     [employer]’s Pixelligent® is a secure, online tool that connects you to your personalized health information from the privacy of your home -- day or night - making it very easy for you to manage your healthcare. Once the activation process is completed, you can even access your medical information using the Pixelligent norma, which is available for free in the Apple Norma store or Google Play store.     Pixelligent provides the following levels of access (as shown below):   My Chart Features   [facility] Doctor [facility]  Specialists [facility]  Urgent  Care [facility]  Primary Care  Doctor   Email your healthcare team securely and privately 24/7 X X X    Manage appointments: schedule your next appointment; view details of past/upcoming appointments X      Request prescription refills. X      View recent personal medical records, including lab and immunizations X X X X   View health record, including health history, allergies, medications X X X X   Read reports about your outpatient visits, procedures, consult and ER notes X X X X   See your discharge summary, which is a recap of your hospital and/or ER visit that includes your diagnosis, lab results, and care plan. X X       How to register for Pixelligent:  1. Go to  [email or web address].  2. Click on the Sign Up Now box, which takes you to the New Member Sign Up page. You will need to provide the following information:  a. Enter your Pixelligent Access Code exactly as it appears at the top of this page. (You will not need to use this code after you’ve completed the sign-up process. If you do not sign up  before the expiration date, you must request a new code.)   b. Enter your date of birth.   c. Enter your home email address.   d. Click Submit, and follow the next screen’s instructions.  3. Create a ZhenXint ID. This will be your ZhenXint login ID and cannot be changed, so think of one that is secure and easy to remember.  4. Create a ZhenXint password. You can change your password at any time.  5. Enter your Password Reset Question and Answer. This can be used at a later time if you forget your password.   6. Enter your e-mail address. This allows you to receive e-mail notifications when new information is available in [employer].  7. Click Sign Up. You can now view your health information.    For assistance activating your [employer] account, call [phone]

## 2017-08-10 NOTE — PROGRESS NOTES
"Chief Complaint   Patient presents with   • Establish Care     Danilo Jordan is a 26 y.o. male here to [facility] and for annual well exam, he has no acute complaints. Denies any chronic illness, takes no daily medication. He works as an  at the [employer], he is single and lives alone. He admits that he is not exercising and his diet has been poor.  He was treated approximately 2 weeks ago for chlamydia infection and is requesting retest. Denies any penile discharge, testicular pain, dysuria, hematuria. Has not been sexually active since treatment. Heterosexual, recently ended a long-term relationship    Current medicines (including changes today)  No current outpatient prescriptions on file.     No current facility-administered medications for this visit.     He  has no past medical history on file.  He  has past surgical history that includes appendectomy and nasal fracture reduction closed (N/A, 6/9/2015).  Social History   Substance Use Topics   • Smoking status: Never Smoker    • Smokeless tobacco: Never Used   • Alcohol Use: Yes     Social History     Social History Narrative     History reviewed. No pertinent family history.  Family Status   Relation Status Death Age   • Mother Alive    • Father Alive    • Sister Alive    • Brother Alive      ROS  Problems listed discussed above, all other systems reviewed and negative     Objective:     Blood pressure 112/62, pulse 65, temperature 36.8 °C (98.2 °F), resp. rate 16, height 1.753 m (5' 9\"), weight 68.04 kg (150 lb), SpO2 98 %. Body mass index is 22.14 kg/(m^2).  Physical Exam:  General: Alert, oriented in no acute distress.  Eye contact is good, speech is normal, affect calm  HEENT: perrl, Oral mucosa pink moist, no lesions. Nares patent. TMs gray with good landmarks bilaterally. No cervical or supraclavicular lymphadenopathy, thyroid isthmus palpable without masses or nodules.  Lungs: clear to auscultation bilaterally, good " aeration, normal effort. No wheeze/ rhonchi/ rales.  CV: regular rate and rhythm, S1, S2. No murmur, no JVD, no edema. Pedal pulses 2 + bilaterally  Abdomen: soft, nontender, BS x4,  no hepatosplenomegaly.  Ext: color normal, vascularity normal, temperature normal. No rash or lesions.  MS: no point tenderness over spine, no obvious deformity. No joint swelling or redness. Strength is 5/5 globally  Neuro: DTR 2+ bilaterally  Assessment and Plan:   The following treatment plan was discussed   1. Annual physical exam   normal physical exam. General health and wellness discussion including healthy diet, regular exercise. 2000 international units vitamin D3 daily, follow up annually    2. History of chlamydia   treated 2 weeks ago and requesting retest. We've discussed that testing for gonorrhea and chlamydia does not differentiate from dad and life bacteria. He should, therefore, wait an additional 2-3 weeks prior to retest. He will present to lab at that time for urine sample, advised not to urinate at least 2 hours prior CHLAMYDIA/GC PCR URINE OR SWAB       Educated in proper administration of medication(s) ordered today including safety, possible SE, risks, benefits, rationale and alternatives to therapy.     Followup: annually, sooner as needed             Please note that this dictation was created using voice recognition software. I have worked with consultants from the vendor as well as technical experts from [facility] [employer] to optimize the interface. I have made every reasonable attempt to correct obvious errors, but I expect that there are errors of grammar and possibly content that I did not discover before finalizing the note.

## 2017-12-15 ENCOUNTER — OFFICE VISIT (OUTPATIENT)
Dept: URGENT CARE | Facility: CLINIC | Age: 27
End: 2017-12-15
Payer: COMMERCIAL

## 2017-12-15 VITALS
SYSTOLIC BLOOD PRESSURE: 118 MMHG | WEIGHT: 156 LBS | DIASTOLIC BLOOD PRESSURE: 70 MMHG | TEMPERATURE: 98.3 F | RESPIRATION RATE: 16 BRPM | OXYGEN SATURATION: 97 % | BODY MASS INDEX: 23.11 KG/M2 | HEART RATE: 72 BPM | HEIGHT: 69 IN

## 2017-12-15 DIAGNOSIS — J02.0 PHARYNGITIS DUE TO STREPTOCOCCUS SPECIES: ICD-10-CM

## 2017-12-15 LAB
INT CON NEG: NORMAL
INT CON POS: NORMAL
S PYO AG THROAT QL: POSITIVE

## 2017-12-15 PROCEDURE — 87880 STREP A ASSAY W/OPTIC: CPT | Performed by: NURSE PRACTITIONER

## 2017-12-15 PROCEDURE — 99214 OFFICE O/P EST MOD 30 MIN: CPT | Performed by: NURSE PRACTITIONER

## 2017-12-15 RX ORDER — AMOXICILLIN 875 MG/1
875 TABLET, COATED ORAL 2 TIMES DAILY
Qty: 14 TAB | Refills: 0 | Status: SHIPPED | OUTPATIENT
Start: 2017-12-15 | End: 2017-12-22

## 2017-12-15 ASSESSMENT — ENCOUNTER SYMPTOMS
SWOLLEN GLANDS: 0
CHILLS: 0
VOMITING: 0
FEVER: 0
MYALGIAS: 0
HEADACHES: 0
EYE PAIN: 0
TROUBLE SWALLOWING: 0
COUGH: 0
SORE THROAT: 1
DIZZINESS: 0
NECK PAIN: 1
SHORTNESS OF BREATH: 0
NAUSEA: 0

## 2017-12-15 ASSESSMENT — PAIN SCALES - GENERAL: PAINLEVEL: 3=SLIGHT PAIN

## 2017-12-15 NOTE — PROGRESS NOTES
Subjective:     Danilo Jordan is a 27 y.o. male who presents for Pharyngitis (hurts to swallow x 3-4 days, girlfriend dx with strep )       Pharyngitis    This is a new (3-4 days) problem. The current episode started today. The problem has been gradually worsening. There has been no fever. The pain is at a severity of 5/10. The pain is moderate. Associated symptoms include congestion and neck pain. Pertinent negatives include no coughing, ear pain, headaches, plugged ear sensation, shortness of breath, swollen glands, trouble swallowing or vomiting. He has had exposure to strep. He has tried nothing for the symptoms.   No past medical history on file.  Past Surgical History:   Procedure Laterality Date   • NASAL FRACTURE REDUCTION CLOSED N/A 6/9/2015    Procedure: NASAL FRACTURE REDUCTION CLOSED;  Surgeon: Cam Saab M.D.;  Location: SURGERY SURG[facility];  Service:    • APPENDECTOMY       Social History     Social History   • Marital status: Single     Spouse name: N/A   • Number of children: N/A   • Years of education: N/A     Occupational History   • Not on file.     Social History Main Topics   • Smoking status: Never Smoker   • Smokeless tobacco: Never Used   • Alcohol use Yes   • Drug use: No   • Sexual activity: Yes     Partners: Female     Birth control/ protection: Condom     Other Topics Concern   • Not on file     Social History Narrative   • No narrative on file    No family history on file. Review of Systems   Constitutional: Negative for chills and fever.   HENT: Positive for congestion and sore throat. Negative for ear pain and trouble swallowing.    Eyes: Negative for pain.   Respiratory: Negative for cough and shortness of breath.    Cardiovascular: Negative for chest pain.   Gastrointestinal: Negative for nausea and vomiting.   Genitourinary: Negative for hematuria.   Musculoskeletal: Positive for neck pain. Negative for myalgias.   Skin: Negative for rash.   Neurological:  "Negative for dizziness and headaches.   No Known Allergies   Objective:   /70   Pulse 72   Temp 36.8 °C (98.3 °F)   Resp 16   Ht 1.753 m (5' 9\")   Wt 70.8 kg (156 lb)   SpO2 97%   BMI 23.04 kg/m²   Physical Exam   Constitutional: He is oriented to person, place, and time. He appears well-developed and well-nourished. No distress.   HENT:   Head: Normocephalic and atraumatic.   Right Ear: Tympanic membrane normal.   Left Ear: Tympanic membrane normal.   Nose: Nose normal. Right sinus exhibits no maxillary sinus tenderness and no frontal sinus tenderness. Left sinus exhibits no maxillary sinus tenderness and no frontal sinus tenderness.   Mouth/Throat: Uvula is midline and mucous membranes are normal. Posterior oropharyngeal edema and posterior oropharyngeal erythema present. No tonsillar abscesses. Tonsils are 2+ on the right. Tonsils are 2+ on the left. No tonsillar exudate.   Eyes: Conjunctivae and EOM are normal. Pupils are equal, round, and reactive to light. Right eye exhibits no discharge. Left eye exhibits no discharge.   Cardiovascular: Normal rate and regular rhythm.    No murmur heard.  Pulmonary/Chest: Effort normal and breath sounds normal. No respiratory distress.   Abdominal: Soft. He exhibits no distension. There is no tenderness.   Neurological: He is alert and oriented to person, place, and time. He has normal reflexes. No sensory deficit.   Skin: Skin is warm, dry and intact.   Psychiatric: He has a normal mood and affect.         Assessment/Plan:   Assessment    1. Pharyngitis due to Streptococcus species  - POCT Rapid Strep A  - amoxicillin (AMOXIL) 875 MG tablet; Take 1 Tab by mouth 2 times a day for 7 days.  Dispense: 14 Tab; Refill: 0     Positive strep   Advised to continue supportive care in conjunction to abx therapy.     Patient given precautionary s/sx that mandate immediate follow up and evaluation in the ED. Advised of risks of not doing so.    DDX, Supportive care, and " indications for immediate follow-up discussed with patient.    Instructed to return to clinic or nearest emergency department if we are not available for any change in condition, further concerns, or worsening of symptoms.    The patient demonstrated a good understanding and agreed with the treatment plan.

## 2017-12-15 NOTE — LETTER
December 15, 2017         Patient: Danilo Jordan   YOB: 1990   Date of Visit: 12/15/2017           To Whom it May Concern:    Danilo Jordan was seen in my clinic on 12/15/2017. He may return to work on 12/16/17.    If you have any questions or concerns, please don't hesitate to call.        Sincerely,           KRUNAL Wall.  Electronically Signed

## 2018-01-22 ENCOUNTER — OFFICE VISIT (OUTPATIENT)
Dept: URGENT CARE | Facility: CLINIC | Age: 28
End: 2018-01-22
Payer: COMMERCIAL

## 2018-01-22 VITALS
SYSTOLIC BLOOD PRESSURE: 120 MMHG | WEIGHT: 150 LBS | HEIGHT: 69 IN | BODY MASS INDEX: 22.22 KG/M2 | RESPIRATION RATE: 20 BRPM | HEART RATE: 78 BPM | TEMPERATURE: 98 F | DIASTOLIC BLOOD PRESSURE: 80 MMHG | OXYGEN SATURATION: 96 %

## 2018-01-22 DIAGNOSIS — R10.13 EPIGASTRIC PAIN: ICD-10-CM

## 2018-01-22 PROCEDURE — 99214 OFFICE O/P EST MOD 30 MIN: CPT | Performed by: PHYSICIAN ASSISTANT

## 2018-01-22 NOTE — LETTER
January 22, 2018         Patient: Danilo Jordan   YOB: 1990   Date of Visit: 1/22/2018           To Whom it May Concern:    Danilo Jordan was seen in my clinic on 1/22/2018. Please excuse him from work on this day.  If you have any questions or concerns, please don't hesitate to call.        Sincerely,           Bhavin Livingston P.A.-C.  Electronically Signed

## 2018-01-23 ENCOUNTER — HOSPITAL ENCOUNTER (OUTPATIENT)
Dept: LAB | Facility: MEDICAL CENTER | Age: 28
End: 2018-01-23
Attending: NURSE PRACTITIONER
Payer: COMMERCIAL

## 2018-01-23 ENCOUNTER — HOSPITAL ENCOUNTER (OUTPATIENT)
Dept: LAB | Facility: MEDICAL CENTER | Age: 28
End: 2018-01-23
Attending: PHYSICIAN ASSISTANT
Payer: COMMERCIAL

## 2018-01-23 DIAGNOSIS — Z86.19 HISTORY OF CHLAMYDIA: ICD-10-CM

## 2018-01-23 DIAGNOSIS — R10.13 EPIGASTRIC PAIN: ICD-10-CM

## 2018-01-23 LAB
ALBUMIN SERPL BCP-MCNC: 4.2 G/DL (ref 3.2–4.9)
ALBUMIN/GLOB SERPL: 1.4 G/DL
ALP SERPL-CCNC: 70 U/L (ref 30–99)
ALT SERPL-CCNC: 23 U/L (ref 2–50)
AMYLASE SERPL-CCNC: 31 U/L (ref 20–103)
ANION GAP SERPL CALC-SCNC: 7 MMOL/L (ref 0–11.9)
AST SERPL-CCNC: 22 U/L (ref 12–45)
BASOPHILS # BLD AUTO: 0.7 % (ref 0–1.8)
BASOPHILS # BLD: 0.03 K/UL (ref 0–0.12)
BILIRUB SERPL-MCNC: 0.4 MG/DL (ref 0.1–1.5)
BUN SERPL-MCNC: 10 MG/DL (ref 8–22)
CALCIUM SERPL-MCNC: 9.7 MG/DL (ref 8.5–10.5)
CHLORIDE SERPL-SCNC: 103 MMOL/L (ref 96–112)
CO2 SERPL-SCNC: 27 MMOL/L (ref 20–33)
CREAT SERPL-MCNC: 0.79 MG/DL (ref 0.5–1.4)
EOSINOPHIL # BLD AUTO: 0.04 K/UL (ref 0–0.51)
EOSINOPHIL NFR BLD: 0.9 % (ref 0–6.9)
ERYTHROCYTE [DISTWIDTH] IN BLOOD BY AUTOMATED COUNT: 39.3 FL (ref 35.9–50)
GLOBULIN SER CALC-MCNC: 3.1 G/DL (ref 1.9–3.5)
GLUCOSE SERPL-MCNC: 94 MG/DL (ref 65–99)
HCT VFR BLD AUTO: 46.1 % (ref 42–52)
HGB BLD-MCNC: 15.9 G/DL (ref 14–18)
IMM GRANULOCYTES # BLD AUTO: 0.01 K/UL (ref 0–0.11)
IMM GRANULOCYTES NFR BLD AUTO: 0.2 % (ref 0–0.9)
LIPASE SERPL-CCNC: 20 U/L (ref 11–82)
LYMPHOCYTES # BLD AUTO: 1.72 K/UL (ref 1–4.8)
LYMPHOCYTES NFR BLD: 39.5 % (ref 22–41)
MCH RBC QN AUTO: 31.8 PG (ref 27–33)
MCHC RBC AUTO-ENTMCNC: 34.5 G/DL (ref 33.7–35.3)
MCV RBC AUTO: 92.2 FL (ref 81.4–97.8)
MONOCYTES # BLD AUTO: 0.67 K/UL (ref 0–0.85)
MONOCYTES NFR BLD AUTO: 15.4 % (ref 0–13.4)
NEUTROPHILS # BLD AUTO: 1.88 K/UL (ref 1.82–7.42)
NEUTROPHILS NFR BLD: 43.3 % (ref 44–72)
NRBC # BLD AUTO: 0 K/UL
NRBC BLD-RTO: 0 /100 WBC
PLATELET # BLD AUTO: 257 K/UL (ref 164–446)
PMV BLD AUTO: 9.2 FL (ref 9–12.9)
POTASSIUM SERPL-SCNC: 3.9 MMOL/L (ref 3.6–5.5)
PROT SERPL-MCNC: 7.3 G/DL (ref 6–8.2)
RBC # BLD AUTO: 5 M/UL (ref 4.7–6.1)
SODIUM SERPL-SCNC: 137 MMOL/L (ref 135–145)
WBC # BLD AUTO: 4.4 K/UL (ref 4.8–10.8)

## 2018-01-23 PROCEDURE — 83013 H PYLORI (C-13) BREATH: CPT

## 2018-01-23 PROCEDURE — 85025 COMPLETE CBC W/AUTO DIFF WBC: CPT

## 2018-01-23 PROCEDURE — 87591 N.GONORRHOEAE DNA AMP PROB: CPT

## 2018-01-23 PROCEDURE — 87491 CHLMYD TRACH DNA AMP PROBE: CPT

## 2018-01-23 PROCEDURE — 82150 ASSAY OF AMYLASE: CPT

## 2018-01-23 PROCEDURE — 83690 ASSAY OF LIPASE: CPT

## 2018-01-23 PROCEDURE — 36415 COLL VENOUS BLD VENIPUNCTURE: CPT

## 2018-01-23 PROCEDURE — 80053 COMPREHEN METABOLIC PANEL: CPT

## 2018-01-23 ASSESSMENT — ENCOUNTER SYMPTOMS
VOMITING: 0
DIAPHORESIS: 0
ROS GI COMMENTS: 1
WEAKNESS: 0
FEVER: 0
DIARRHEA: 0
WEIGHT LOSS: 0
NAUSEA: 1
DIZZINESS: 0
ABDOMINAL PAIN: 1
SHORTNESS OF BREATH: 0
HEARTBURN: 1
CHILLS: 0
CONSTIPATION: 0
COUGH: 0
PALPITATIONS: 0
BLOOD IN STOOL: 0

## 2018-01-23 NOTE — PATIENT INSTRUCTIONS
Peptic Ulcer  A peptic ulcer is a sore in the lining of your esophagus (esophageal ulcer), stomach (gastric ulcer), or in the first part of your small intestine (duodenal ulcer). The ulcer causes erosion into the deeper tissue.  CAUSES   Normally, the lining of the stomach and the small intestine protects itself from the acid that digests food. The protective lining can be damaged by:  · An infection caused by a bacterium called Helicobacter pylori (H. pylori).  · Regular use of nonsteroidal anti-inflammatory drugs (NSAIDs), such as ibuprofen or aspirin.  · Smoking tobacco.  Other risk factors include being older than 50, drinking alcohol excessively, and having a family history of ulcer disease.   SYMPTOMS   · Burning pain or gnawing in the area between the chest and the belly button.  · Heartburn.  · Nausea and vomiting.  · Bloating.  The pain can be worse on an empty stomach and at night. If the ulcer results in bleeding, it can cause:  · Black, tarry stools.  · Vomiting of bright red blood.  · Vomiting of coffee-ground-looking materials.  DIAGNOSIS   A diagnosis is usually made based upon your history and an exam. Other tests and procedures may be performed to find the cause of the ulcer. Finding a cause will help determine the best treatment. Tests and procedures may include:  · Blood tests, stool tests, or breath tests to check for the bacterium H. pylori.  · An upper gastrointestinal (GI) series of the esophagus, stomach, and small intestine.  · An endoscopy to examine the esophagus, stomach, and small intestine.  · A biopsy.  TREATMENT   Treatment may include:  · Eliminating the cause of the ulcer, such as smoking, NSAIDs, or alcohol.  · Medicines to reduce the amount of acid in your digestive tract.  · Antibiotic medicines if the ulcer is caused by the H. pylori bacterium.  · An upper endoscopy to treat a bleeding ulcer.  · Surgery if the bleeding is severe or if the ulcer created a hole somewhere in the  digestive system.  HOME CARE INSTRUCTIONS   · Avoid tobacco, alcohol, and caffeine. Smoking can increase the acid in the stomach, and continued smoking will impair the healing of ulcers.  · Avoid foods and drinks that seem to cause discomfort or aggravate your ulcer.  · Only take medicines as directed by your caregiver. Do not substitute over-the-counter medicines for prescription medicines without talking to your caregiver.  · Keep any follow-up appointments and tests as directed.  SEEK MEDICAL CARE IF:   · Your do not improve within 7 days of starting treatment.  · You have ongoing indigestion or heartburn.  SEEK IMMEDIATE MEDICAL CARE IF:   · You have sudden, sharp, or persistent abdominal pain.  · You have bloody or dark black, tarry stools.  · You vomit blood or vomit that looks like coffee grounds.  · You become light-headed, weak, or feel faint.  · You become sweaty or clammy.  MAKE SURE YOU:   · Understand these instructions.  · Will watch your condition.  · Will get help right away if you are not doing well or get worse.     This information is not intended to replace advice given to you by your health care provider. Make sure you discuss any questions you have with your health care provider.     Document Released: 12/15/2001 Document Revised: 01/08/2016 Document Reviewed: 07/17/2013  [email or web address] Interactive Patient Education ©2016 [employer]

## 2018-01-24 LAB
C TRACH DNA SPEC QL NAA+PROBE: NEGATIVE
N GONORRHOEA DNA SPEC QL NAA+PROBE: NEGATIVE
SPECIMEN SOURCE: NORMAL

## 2018-01-24 NOTE — PROGRESS NOTES
"Subjective:      Danilo Jordan is a 27 y.o. male who presents with GI Problem (Few days epigastric pain and heartburn )            GI Problem   This is a new problem. The current episode started 1 to 4 weeks ago. The problem occurs intermittently. The problem has been waxing and waning. Associated symptoms include abdominal pain and nausea. Pertinent negatives include no chest pain, chills, coughing, diaphoresis, fever, vomiting or weakness. Associated symptoms comments: Gerd, epigastric pain when eating.. The symptoms are aggravated by drinking and eating. He has tried nothing for the symptoms.       Review of Systems   Constitutional: Negative for chills, diaphoresis, fever, malaise/fatigue and weight loss.   Respiratory: Negative for cough and shortness of breath.    Cardiovascular: Negative for chest pain and palpitations.   Gastrointestinal: Positive for abdominal pain, heartburn and nausea. Negative for blood in stool, constipation, diarrhea, melena and vomiting.        1   Neurological: Negative for dizziness and weakness.   All other systems reviewed and are negative.    PMH:  has no past medical history on file.  MEDS: No current outpatient prescriptions on file.  ALLERGIES: No Known Allergies  SURGHX:   Past Surgical History:   Procedure Laterality Date   • NASAL FRACTURE REDUCTION CLOSED N/A 6/9/2015    Procedure: NASAL FRACTURE REDUCTION CLOSED;  Surgeon: Cam Saab M.D.;  Location: SURGERY SURG[facility];  Service:    • APPENDECTOMY       SOCHX:  reports that he has never smoked. He has never used smokeless tobacco. He reports that he drinks alcohol. He reports that he does not use drugs.  FH: Family history was reviewed, no pertinent findings to report  Medications, Allergies, and current problem list reviewed today in Epic       Objective:     /80   Pulse 78   Temp 36.7 °C (98 °F)   Resp 20   Ht 1.753 m (5' 9\")   Wt 68 kg (150 lb)   SpO2 96%   BMI 22.15 kg/m²  "     Physical Exam   Constitutional: He is oriented to person, place, and time. He appears well-developed and well-nourished. He is active.  Non-toxic appearance. He does not have a sickly appearance. He does not appear ill. No distress.   HENT:   Head: Normocephalic and atraumatic.   Right Ear: External ear normal.   Left Ear: External ear normal.   Nose: Nose normal.   Mouth/Throat: Oropharynx is clear and moist.   Eyes: Conjunctivae, EOM and lids are normal.   Neck: Normal range of motion and full passive range of motion without pain. Neck supple.   Cardiovascular: Normal rate, regular rhythm, S1 normal, S2 normal and normal heart sounds.  Exam reveals no gallop and no friction rub.    No murmur heard.  Pulmonary/Chest: Effort normal and breath sounds normal. No respiratory distress. He has no decreased breath sounds. He has no wheezes. He has no rales. He exhibits no tenderness.   Abdominal: Soft. Normal appearance and bowel sounds are normal. He exhibits no shifting dullness, no distension, no pulsatile liver, no fluid wave, no abdominal bruit, no ascites, no pulsatile midline mass and no mass. There is tenderness in the epigastric area. There is no rigidity, no rebound, no guarding, no CVA tenderness, no tenderness at McBurney's point and negative Rwoe's sign.       Musculoskeletal: Normal range of motion. He exhibits no edema, tenderness or deformity.   Neurological: He is alert and oriented to person, place, and time.   Skin: Skin is warm, dry and intact.   Psychiatric: He has a normal mood and affect. His speech is normal and behavior is normal. Judgment and thought content normal.   Vitals reviewed.              Assessment/Plan:   Patient is a 27-year-old male who presents to the urgent care with 2+ weeks of epigastric pain. States that it is worse when eating and is loss of appetite. He denies any blood in stool. He does binge drink most weekends. He has always one alcoholic beverage a day. Vitals are  normal there is mild pain to palpation epigastric region. No rebound tenderness is noted. Diagnosed control includes gastritis versus peptic ulcer. Strict ED precautions are given as worsening pain in the abdomen.  1. Epigastric pain    - COMP METABOLIC PANEL; Future  - CBC WITH DIFFERENTIAL; Future  - H. PYLORI BREATH TEST  - AMYLASE; Future  - LIPASE; Future  - REFERRAL TO GASTROENTEROLOGY    Differential diagnosis, natural history, supportive care, and indications for immediate follow-up discussed at length.   Follow-up with primary care provider within 4-5 days, emergency room precautions discussed.  Patient and/or family appears understanding of information.

## 2018-01-25 ENCOUNTER — TELEPHONE (OUTPATIENT)
Dept: MEDICAL GROUP | Facility: MEDICAL CENTER | Age: 28
End: 2018-01-25

## 2018-01-25 LAB — UREA BREATH TEST QL: NEGATIVE

## 2018-01-25 NOTE — TELEPHONE ENCOUNTER
----- Message from GAVIN Benoit sent at 1/24/2018  8:53 PM PST -----  Please inform pt STD testing is negative

## 2019-12-18 NOTE — PROGRESS NOTES
Patient's wife Estrella called in to PDM to discuss issue obtaining maintenance medication. Informed of prescription change from Brovana and Pulmicort to Advair HFA. Advised that Advair HFA has been sent to [facility].   Reviewed frequency of use for Advair, verbally reviewed MDI technique, and reminded to have patient rinse mouth following use. Patient's wife verbalized understanding. Advised to call PDM with any further concerns or questions.     Time spent on call: 5 mins   Subjective:      Danilo Jordan is a 26 y.o. male who presents with Exposure to STD    PMH: no history of chronic illness    Social hx: sexually active, female partners    Family hx: not pertinent to today's visit    Allergies: Review of patient's allergies indicates no known allergies.    Patient presents today with complaint of exposure to sexually transmitted illness. She was notified by a former partner that she was positive for chlamydia. Patient arrives today requesting treatment. No symptoms at this time.          Exposure to STD  This is a new problem. The current episode started 1 to 4 weeks ago. The problem has been unchanged. Associated symptoms comments: NO SYMPTOMS. Nothing aggravates the symptoms. He has tried nothing for the symptoms. The treatment provided no relief.       Review of Systems   Constitutional: Negative.    HENT: Negative.    Genitourinary: Negative.    Skin: Negative.        All other systems reviewed and are negative      Objective:     /80 mmHg  Pulse 78  Temp(Src) 37 °C (98.6 °F)  Resp 20  SpO2 96%     Physical Exam   Constitutional: He is oriented to person, place, and time. He appears well-developed and well-nourished. No distress.   Cardiovascular: Normal rate and regular rhythm.    Pulmonary/Chest: Effort normal and breath sounds normal.   Genitourinary:   Deferred by patient   Musculoskeletal: Normal range of motion.   Neurological: He is alert and oriented to person, place, and time.   Skin: Skin is warm and dry. He is not diaphoretic.   Psychiatric: He has a normal mood and affect. His behavior is normal.   Vitals reviewed.              Assessment/Plan:   Exposure to STD  -zithromax 1000 mg PO x 1   -rocephin 250 mg IM x 1   -Labs ordered urine for gonorrhea/chlamydia  -Labs ordered: hsv, hpv, hiv, RPR at patient's request.   There are no diagnoses linked to this encounter.

## 2020-06-29 ENCOUNTER — APPOINTMENT (OUTPATIENT)
Dept: RADIOLOGY | Facility: MEDICAL CENTER | Age: 30
End: 2020-06-29
Attending: EMERGENCY MEDICINE

## 2020-06-29 ENCOUNTER — HOSPITAL ENCOUNTER (EMERGENCY)
Facility: MEDICAL CENTER | Age: 30
End: 2020-06-29
Attending: EMERGENCY MEDICINE
Payer: COMMERCIAL

## 2020-06-29 ENCOUNTER — APPOINTMENT (OUTPATIENT)
Dept: RADIOLOGY | Facility: MEDICAL CENTER | Age: 30
End: 2020-06-29
Attending: EMERGENCY MEDICINE
Payer: COMMERCIAL

## 2020-06-29 VITALS
TEMPERATURE: 97.6 F | BODY MASS INDEX: 22.85 KG/M2 | WEIGHT: 150.79 LBS | HEART RATE: 60 BPM | SYSTOLIC BLOOD PRESSURE: 116 MMHG | HEIGHT: 68 IN | RESPIRATION RATE: 16 BRPM | OXYGEN SATURATION: 97 % | DIASTOLIC BLOOD PRESSURE: 77 MMHG

## 2020-06-29 DIAGNOSIS — L03.116 CELLULITIS OF LEFT LOWER EXTREMITY: ICD-10-CM

## 2020-06-29 DIAGNOSIS — S81.802A WOUND OF LEFT LOWER EXTREMITY, INITIAL ENCOUNTER: ICD-10-CM

## 2020-06-29 LAB — BLOOD CULTURE HOLD CXBCH: NORMAL

## 2020-06-29 PROCEDURE — A9270 NON-COVERED ITEM OR SERVICE: HCPCS | Performed by: EMERGENCY MEDICINE

## 2020-06-29 PROCEDURE — 700102 HCHG RX REV CODE 250 W/ 637 OVERRIDE(OP): Performed by: EMERGENCY MEDICINE

## 2020-06-29 PROCEDURE — 73590 X-RAY EXAM OF LOWER LEG: CPT | Mod: LT

## 2020-06-29 PROCEDURE — 99283 EMERGENCY DEPT VISIT LOW MDM: CPT

## 2020-06-29 PROCEDURE — 73610 X-RAY EXAM OF ANKLE: CPT | Mod: LT

## 2020-06-29 RX ORDER — CEPHALEXIN 500 MG/1
1000 CAPSULE ORAL 3 TIMES DAILY
Qty: 42 CAP | Refills: 0 | Status: SHIPPED | OUTPATIENT
Start: 2020-06-29 | End: 2020-07-06

## 2020-06-29 RX ORDER — BACITRACIN ZINC 500 [USP'U]/G
OINTMENT TOPICAL ONCE
Status: COMPLETED | OUTPATIENT
Start: 2020-06-29 | End: 2020-06-29

## 2020-06-29 RX ORDER — SULFAMETHOXAZOLE AND TRIMETHOPRIM 800; 160 MG/1; MG/1
1 TABLET ORAL 2 TIMES DAILY
Qty: 14 TAB | Refills: 0 | Status: SHIPPED | OUTPATIENT
Start: 2020-06-29 | End: 2020-07-06

## 2020-06-29 RX ADMIN — BACITRACIN ZINC: 500 OINTMENT TOPICAL at 06:40

## 2020-06-29 SDOH — HEALTH STABILITY: MENTAL HEALTH: HOW MANY STANDARD DRINKS CONTAINING ALCOHOL DO YOU HAVE ON A TYPICAL DAY?: 1 OR 2

## 2020-06-29 SDOH — HEALTH STABILITY: MENTAL HEALTH: HOW OFTEN DO YOU HAVE 6 OR MORE DRINKS ON ONE OCCASION?: NEVER

## 2020-06-29 SDOH — HEALTH STABILITY: MENTAL HEALTH: HOW OFTEN DO YOU HAVE A DRINK CONTAINING ALCOHOL?: MONTHLY OR LESS
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
## 2020-06-29 NOTE — DISCHARGE INSTRUCTIONS
You were seen in the Emergency Department for wound check    Xrays were completed without significant acute abnormalities.    Please use 1,000mg of tylenol or 600mg of ibuprofen every 6 hours as needed for pain.  Apply antibiotic ointment daily.  Avoid hydrogen peroxide.  Take antibiotics as directed.    Please follow up with your primary care physician.    Return to the Emergency Department with worsening pain, swelling, redness, or other concerns.

## 2021-11-18 ENCOUNTER — HOSPITAL ENCOUNTER (EMERGENCY)
Facility: MEDICAL CENTER | Age: 31
End: 2021-11-18
Attending: EMERGENCY MEDICINE

## 2021-11-18 VITALS
HEIGHT: 68 IN | DIASTOLIC BLOOD PRESSURE: 100 MMHG | SYSTOLIC BLOOD PRESSURE: 160 MMHG | WEIGHT: 146.39 LBS | BODY MASS INDEX: 22.19 KG/M2 | HEART RATE: 92 BPM | OXYGEN SATURATION: 95 % | TEMPERATURE: 98 F | RESPIRATION RATE: 16 BRPM

## 2021-11-18 DIAGNOSIS — Z20.2 STD EXPOSURE: ICD-10-CM

## 2021-11-18 DIAGNOSIS — Z20.6 HIV EXPOSURE FROM BODY FLUIDS: ICD-10-CM

## 2021-11-18 PROCEDURE — 85027 COMPLETE CBC AUTOMATED: CPT

## 2021-11-18 PROCEDURE — 86803 HEPATITIS C AB TEST: CPT

## 2021-11-18 PROCEDURE — 86706 HEP B SURFACE ANTIBODY: CPT

## 2021-11-18 PROCEDURE — 80053 COMPREHEN METABOLIC PANEL: CPT

## 2021-11-18 PROCEDURE — 86704 HEP B CORE ANTIBODY TOTAL: CPT

## 2021-11-18 PROCEDURE — 86780 TREPONEMA PALLIDUM: CPT

## 2021-11-18 PROCEDURE — 87491 CHLMYD TRACH DNA AMP PROBE: CPT

## 2021-11-18 PROCEDURE — 700102 HCHG RX REV CODE 250 W/ 637 OVERRIDE(OP): Performed by: EMERGENCY MEDICINE

## 2021-11-18 PROCEDURE — 99284 EMERGENCY DEPT VISIT MOD MDM: CPT

## 2021-11-18 PROCEDURE — 87591 N.GONORRHOEAE DNA AMP PROB: CPT

## 2021-11-18 PROCEDURE — 87389 HIV-1 AG W/HIV-1&-2 AB AG IA: CPT

## 2021-11-18 PROCEDURE — 700101 HCHG RX REV CODE 250: Performed by: EMERGENCY MEDICINE

## 2021-11-18 PROCEDURE — 700111 HCHG RX REV CODE 636 W/ 250 OVERRIDE (IP): Performed by: EMERGENCY MEDICINE

## 2021-11-18 PROCEDURE — 87340 HEPATITIS B SURFACE AG IA: CPT

## 2021-11-18 PROCEDURE — A9270 NON-COVERED ITEM OR SERVICE: HCPCS | Performed by: EMERGENCY MEDICINE

## 2021-11-18 PROCEDURE — 96372 THER/PROPH/DIAG INJ SC/IM: CPT

## 2021-11-18 RX ORDER — DOXYCYCLINE 100 MG/1
100 TABLET ORAL ONCE
Status: COMPLETED | OUTPATIENT
Start: 2021-11-18 | End: 2021-11-18

## 2021-11-18 RX ORDER — RALTEGRAVIR 400 MG/1
400 TABLET, FILM COATED ORAL 2 TIMES DAILY
Qty: 56 TABLET | Refills: 0 | Status: SHIPPED | OUTPATIENT
Start: 2021-11-18 | End: 2021-12-16

## 2021-11-18 RX ORDER — CEFTRIAXONE 500 MG/1
500 INJECTION, POWDER, FOR SOLUTION INTRAMUSCULAR; INTRAVENOUS ONCE
Status: COMPLETED | OUTPATIENT
Start: 2021-11-18 | End: 2021-11-18

## 2021-11-18 RX ORDER — EMTRICITABINE AND TENOFOVIR DISOPROXIL FUMARATE 200; 300 MG/1; MG/1
1 TABLET, FILM COATED ORAL ONCE
Status: COMPLETED | OUTPATIENT
Start: 2021-11-18 | End: 2021-11-18

## 2021-11-18 RX ORDER — EMTRICITABINE AND TENOFOVIR DISOPROXIL FUMARATE 200; 300 MG/1; MG/1
1 TABLET, FILM COATED ORAL DAILY
Qty: 28 TABLET | Refills: 0 | Status: SHIPPED | OUTPATIENT
Start: 2021-11-18 | End: 2021-12-16

## 2021-11-18 RX ADMIN — LIDOCAINE HYDROCHLORIDE 1 ML: 10 INJECTION, SOLUTION INFILTRATION; PERINEURAL at 23:30

## 2021-11-18 RX ADMIN — CEFTRIAXONE SODIUM 500 MG: 500 INJECTION, POWDER, FOR SOLUTION INTRAMUSCULAR; INTRAVENOUS at 23:40

## 2021-11-18 RX ADMIN — DOXYCYCLINE 100 MG: 100 TABLET, FILM COATED ORAL at 23:40

## 2021-11-18 RX ADMIN — RALTEGRAVIR 400 MG: 400 TABLET, FILM COATED ORAL at 23:39

## 2021-11-18 RX ADMIN — EMTRICITABINE AND TENOFOVIR DISOPROXIL FUMARATE 1 TABLET: 200; 300 TABLET, FILM COATED ORAL at 23:39

## 2021-11-18 ASSESSMENT — ENCOUNTER SYMPTOMS
MYALGIAS: 0
DIARRHEA: 0
NAUSEA: 0
FEVER: 0
ABDOMINAL PAIN: 0

## 2021-11-18 ASSESSMENT — LIFESTYLE VARIABLES: DO YOU DRINK ALCOHOL: NO

## 2021-11-19 LAB
ALBUMIN SERPL BCP-MCNC: 5.1 G/DL (ref 3.2–4.9)
ALBUMIN/GLOB SERPL: 1.6 G/DL
ALP SERPL-CCNC: 78 U/L (ref 30–99)
ALT SERPL-CCNC: 26 U/L (ref 2–50)
ANION GAP SERPL CALC-SCNC: 14 MMOL/L (ref 7–16)
AST SERPL-CCNC: 19 U/L (ref 12–45)
BILIRUB SERPL-MCNC: 0.8 MG/DL (ref 0.1–1.5)
BUN SERPL-MCNC: 8 MG/DL (ref 8–22)
C TRACH DNA SPEC QL NAA+PROBE: NEGATIVE
CALCIUM SERPL-MCNC: 9.8 MG/DL (ref 8.5–10.5)
CHLORIDE SERPL-SCNC: 99 MMOL/L (ref 96–112)
CO2 SERPL-SCNC: 25 MMOL/L (ref 20–33)
CREAT SERPL-MCNC: 0.82 MG/DL (ref 0.5–1.4)
ERYTHROCYTE [DISTWIDTH] IN BLOOD BY AUTOMATED COUNT: 40.5 FL (ref 35.9–50)
GLOBULIN SER CALC-MCNC: 3.1 G/DL (ref 1.9–3.5)
GLUCOSE SERPL-MCNC: 93 MG/DL (ref 65–99)
HBV CORE AB SERPL QL IA: NONREACTIVE
HBV SURFACE AB SERPL IA-ACNC: 2353 MIU/ML (ref 0–10)
HBV SURFACE AG SER QL: ABNORMAL
HCT VFR BLD AUTO: 53.3 % (ref 42–52)
HCV AB SER QL: ABNORMAL
HGB BLD-MCNC: 18.9 G/DL (ref 14–18)
HIV 1+2 AB+HIV1 P24 AG SERPL QL IA: ABNORMAL
MCH RBC QN AUTO: 32.8 PG (ref 27–33)
MCHC RBC AUTO-ENTMCNC: 35.5 G/DL (ref 33.7–35.3)
MCV RBC AUTO: 92.4 FL (ref 81.4–97.8)
N GONORRHOEA DNA SPEC QL NAA+PROBE: NEGATIVE
PLATELET # BLD AUTO: 261 K/UL (ref 164–446)
PMV BLD AUTO: 9.2 FL (ref 9–12.9)
POTASSIUM SERPL-SCNC: 3.9 MMOL/L (ref 3.6–5.5)
PROT SERPL-MCNC: 8.2 G/DL (ref 6–8.2)
RBC # BLD AUTO: 5.77 M/UL (ref 4.7–6.1)
SODIUM SERPL-SCNC: 138 MMOL/L (ref 135–145)
SPECIMEN SOURCE: NORMAL
TREPONEMA PALLIDUM IGG+IGM AB [PRESENCE] IN SERUM OR PLASMA BY IMMUNOASSAY: NORMAL
WBC # BLD AUTO: 5.4 K/UL (ref 4.8–10.8)

## 2021-11-19 NOTE — ED NOTES
DC home with written and verbal education on HIV exposure. He has been instructed to follow up with primary ASAP. Educated on my chart. 2 prescriptions sent pharmacy and pt educated on how to take those medications.
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
## 2021-11-23 ENCOUNTER — TELEPHONE (OUTPATIENT)
Dept: HEALTH INFORMATION MANAGEMENT | Facility: OTHER | Age: 31
End: 2021-11-23

## 2022-06-14 ENCOUNTER — HOSPITAL ENCOUNTER (EMERGENCY)
Facility: MEDICAL CENTER | Age: 32
End: 2022-06-14
Attending: EMERGENCY MEDICINE
Payer: COMMERCIAL

## 2022-06-14 VITALS
BODY MASS INDEX: 22.36 KG/M2 | SYSTOLIC BLOOD PRESSURE: 130 MMHG | DIASTOLIC BLOOD PRESSURE: 90 MMHG | WEIGHT: 147.05 LBS | RESPIRATION RATE: 18 BRPM | OXYGEN SATURATION: 99 % | HEART RATE: 72 BPM | TEMPERATURE: 97.5 F

## 2022-06-14 DIAGNOSIS — Z20.2 STD EXPOSURE: ICD-10-CM

## 2022-06-14 DIAGNOSIS — R21 PENILE RASH: ICD-10-CM

## 2022-06-14 LAB
HIV 1+2 AB+HIV1 P24 AG SERPL QL IA: NORMAL
T PALLIDUM AB SER QL IA: NORMAL

## 2022-06-14 PROCEDURE — 87491 CHLMYD TRACH DNA AMP PROBE: CPT

## 2022-06-14 PROCEDURE — 700111 HCHG RX REV CODE 636 W/ 250 OVERRIDE (IP): Performed by: EMERGENCY MEDICINE

## 2022-06-14 PROCEDURE — 99283 EMERGENCY DEPT VISIT LOW MDM: CPT

## 2022-06-14 PROCEDURE — 96372 THER/PROPH/DIAG INJ SC/IM: CPT

## 2022-06-14 PROCEDURE — 87389 HIV-1 AG W/HIV-1&-2 AB AG IA: CPT

## 2022-06-14 PROCEDURE — 700101 HCHG RX REV CODE 250: Performed by: EMERGENCY MEDICINE

## 2022-06-14 PROCEDURE — 87591 N.GONORRHOEAE DNA AMP PROB: CPT

## 2022-06-14 PROCEDURE — 86780 TREPONEMA PALLIDUM: CPT

## 2022-06-14 RX ORDER — DOXYCYCLINE 100 MG/1
100 CAPSULE ORAL 2 TIMES DAILY
Qty: 14 CAPSULE | Refills: 0 | Status: SHIPPED | OUTPATIENT
Start: 2022-06-14 | End: 2022-06-21

## 2022-06-14 RX ORDER — CEFTRIAXONE 500 MG/1
500 INJECTION, POWDER, FOR SOLUTION INTRAMUSCULAR; INTRAVENOUS ONCE
Status: COMPLETED | OUTPATIENT
Start: 2022-06-14 | End: 2022-06-14

## 2022-06-14 RX ORDER — VALACYCLOVIR HYDROCHLORIDE 1 G/1
1000 TABLET, FILM COATED ORAL 2 TIMES DAILY
Qty: 14 TABLET | Refills: 0 | Status: SHIPPED | OUTPATIENT
Start: 2022-06-14 | End: 2022-06-21

## 2022-06-14 RX ADMIN — LIDOCAINE HYDROCHLORIDE 1 ML: 10 INJECTION, SOLUTION EPIDURAL; INFILTRATION; INTRACAUDAL; PERINEURAL at 21:30

## 2022-06-14 RX ADMIN — CEFTRIAXONE SODIUM 500 MG: 500 INJECTION, POWDER, FOR SOLUTION INTRAMUSCULAR; INTRAVENOUS at 21:27

## 2022-06-14 ASSESSMENT — FIBROSIS 4 INDEX: FIB4 SCORE: 0.41

## 2022-06-15 NOTE — ED PROVIDER NOTES
ED Physician Note    Chief Concern:   STI exposure, penile rash    HPI:  Danilo Venegas is a very pleasant 31-year-old gentleman who presents to the emergency department for evaluation of a possible STI exposure, and mild penile rash.  He is concerned he may have been exposed to a sexually transmitted infection 3 days ago.  2 days ago he developed some itching which is described as localized to the urethra, as well as a few small lesions at the base of the glans.  He first noticed the lesions today.  He reports no fevers, no chills, no viral symptoms.  He reports no dysuria, no abnormal penile discharge.  He reports no significant past medical history, no testicular pain.    Review of Systems:  See HPI for pertinent positives and negatives.     Past Medical History:       Social History:  Social History     Tobacco Use   • Smoking status: Never Smoker   • Smokeless tobacco: Never Used   Vaping Use   • Vaping Use: Never used   Substance and Sexual Activity   • Alcohol use: Yes     Comment: socially    • Drug use: No   • Sexual activity: Yes     Partners: Female     Birth control/protection: Condom       Surgical History:   has a past surgical history that includes appendectomy and nasal fracture reduction closed (N/A, 6/9/2015).    Current Medications:  Home Medications    **Home medications have not yet been reviewed for this encounter**         Allergies:  No Known Allergies    Physical Exam:  Vital Signs: BP (!) 130/90   Pulse 72   Temp 36.4 °C (97.5 °F) (Temporal)   Resp 18   Wt 66.7 kg (147 lb 0.8 oz)   SpO2 99%   BMI 22.36 kg/m²   Constitutional: Alert, no acute distress  HENT: Normocephalic  Cardiovascular: Normal cardiac rate  Pulmonary: No respiratory distress, normal work of breathing  : 2 small lesions at the base of the glans, possibly early HSV lesions, no penile discharge  Skin: Warm, dry, no rashes or lesions excepting as documented in  exam  Musculoskeletal: Normal range of  motion in all extremities, no swelling or deformity noted  Neurologic: Alert, oriented, normal speech, normal motor function  Psychiatric: Normal and appropriate mood and affect    Medical records reviewed for continuity of care.  Mr. Venegas was seen in this emergency department 11/18/2021 out of concern for STD exposure.  No symptoms reported at that time.  He received a Snapchat from a girl he had recently had intercourse with, who told him that she had HIV.  He was empirically treated for gonorrhea and chlamydia, and was started on postexposure HIV prophylaxis with Truvada and raltegravir.      Labs:  Labs Reviewed   CHLAMYDIA/GC, PCR (URINE)   T.PALLIDUM AB EIA   HIV AG/AB COMBO ASSAY SCREENING       Radiology:  No orders to display        ED Medications Administered:  Medications   cefTRIAXone (ROCEPHIN) injection 500 mg (500 mg Intramuscular Given 6/14/22 2127)   lidocaine (XYLOCAINE) 1 % injection 0.9-2.1 mL (1 mL Other Given 6/14/22 2130)     MDM:  Mr. Venegas presents to the emergency department out of concern for sexually transmitted infection exposure.    On laboratory evaluation HIV and syphilis testing today resulted negative.  GC chlamydia testing is pending at this time.    He does have 2 small lesions, at this time this does not represent a clear vesicular rash, however these may be an early presentation of HSV.  For that reason is discharged with prescription for valacyclovir.  He was also treated with Rocephin, and discharged with a prescription for doxycycline for GC chlamydia prophylaxis.  She is chlamydia testing is pending at this time.    He will follow-up with his primary care physician for complete recheck within 1 week. Return precautions were discussed with the patient, and provided in written form with the patient's discharge instructions.     Personal protective equipment including N95 surgical respirator, goggles, and gloves were used during this encounter.        Disposition:  Discharge home in stable condition    Final Impression:  1. STD exposure    2. Penile rash        Electronically signed by: Hemalatha Robison MD, 6/14/2022 10:38 PM

## 2022-06-15 NOTE — DISCHARGE INSTRUCTIONS
Please take the medications as prescribed.  Please follow-up with your primary care practitioner for complete recheck within a week.  You should follow-up for repeat sexually-transmitted infection testing, please schedule this with your primary care practitioner.  Return to the emergency department if you develop any new or worsening symptoms including worsening pain, worsening rash, fevers, nausea or vomiting, or any further concerns.

## 2022-06-15 NOTE — ED TRIAGE NOTES
"Chief Complaint   Patient presents with   • Exposure to STD     \"Itchy feeling down here\" x2days     Pt ambulated to triage reports having intercourse last Saturday and condom broke. He has been experiencing itching to his penis area.  "

## 2022-06-15 NOTE — ED NOTES
PT ambulated to private room for ERP assessment; PT tolerated well. This Tech chaperoned assessment. PT back to TCS.

## 2022-06-21 ENCOUNTER — HOSPITAL ENCOUNTER (EMERGENCY)
Facility: MEDICAL CENTER | Age: 32
End: 2022-06-22
Attending: STUDENT IN AN ORGANIZED HEALTH CARE EDUCATION/TRAINING PROGRAM
Payer: COMMERCIAL

## 2022-06-21 ENCOUNTER — APPOINTMENT (OUTPATIENT)
Dept: URGENT CARE | Facility: CLINIC | Age: 32
End: 2022-06-21
Payer: COMMERCIAL

## 2022-06-21 DIAGNOSIS — R51.9 ACUTE NONINTRACTABLE HEADACHE, UNSPECIFIED HEADACHE TYPE: Primary | ICD-10-CM

## 2022-06-21 PROCEDURE — 99283 EMERGENCY DEPT VISIT LOW MDM: CPT

## 2022-06-21 ASSESSMENT — FIBROSIS 4 INDEX: FIB4 SCORE: 0.41

## 2022-06-22 VITALS
SYSTOLIC BLOOD PRESSURE: 132 MMHG | DIASTOLIC BLOOD PRESSURE: 88 MMHG | HEART RATE: 98 BPM | BODY MASS INDEX: 22.82 KG/M2 | TEMPERATURE: 98.5 F | HEIGHT: 68 IN | OXYGEN SATURATION: 98 % | RESPIRATION RATE: 15 BRPM | WEIGHT: 150.57 LBS

## 2022-06-22 PROCEDURE — A9270 NON-COVERED ITEM OR SERVICE: HCPCS | Performed by: STUDENT IN AN ORGANIZED HEALTH CARE EDUCATION/TRAINING PROGRAM

## 2022-06-22 PROCEDURE — 700102 HCHG RX REV CODE 250 W/ 637 OVERRIDE(OP): Performed by: STUDENT IN AN ORGANIZED HEALTH CARE EDUCATION/TRAINING PROGRAM

## 2022-06-22 RX ORDER — DIPHENHYDRAMINE HCL 25 MG
25 TABLET ORAL ONCE
Status: COMPLETED | OUTPATIENT
Start: 2022-06-22 | End: 2022-06-22

## 2022-06-22 RX ORDER — PROCHLORPERAZINE MALEATE 5 MG/1
5 TABLET ORAL EVERY 6 HOURS PRN
Status: DISCONTINUED | OUTPATIENT
Start: 2022-06-22 | End: 2022-06-22 | Stop reason: HOSPADM

## 2022-06-22 RX ORDER — IBUPROFEN 600 MG/1
600 TABLET ORAL ONCE
Status: COMPLETED | OUTPATIENT
Start: 2022-06-22 | End: 2022-06-22

## 2022-06-22 RX ORDER — ACETAMINOPHEN 500 MG
500-1000 TABLET ORAL EVERY 6 HOURS PRN
Qty: 30 TABLET | Refills: 0 | Status: SHIPPED | OUTPATIENT
Start: 2022-06-22

## 2022-06-22 RX ORDER — KETOROLAC TROMETHAMINE 30 MG/ML
15 INJECTION, SOLUTION INTRAMUSCULAR; INTRAVENOUS ONCE
Status: DISCONTINUED | OUTPATIENT
Start: 2022-06-22 | End: 2022-06-22

## 2022-06-22 RX ORDER — PROCHLORPERAZINE MALEATE 10 MG
10 TABLET ORAL EVERY 6 HOURS PRN
Qty: 20 TABLET | Refills: 0 | Status: SHIPPED | OUTPATIENT
Start: 2022-06-22

## 2022-06-22 RX ORDER — IBUPROFEN 600 MG/1
600 TABLET ORAL EVERY 6 HOURS PRN
Qty: 30 TABLET | Refills: 0 | Status: SHIPPED | OUTPATIENT
Start: 2022-06-22

## 2022-06-22 RX ORDER — ACETAMINOPHEN 325 MG/1
650 TABLET ORAL ONCE
Status: COMPLETED | OUTPATIENT
Start: 2022-06-22 | End: 2022-06-22

## 2022-06-22 RX ORDER — DEXAMETHASONE 4 MG/1
8 TABLET ORAL ONCE
Status: COMPLETED | OUTPATIENT
Start: 2022-06-22 | End: 2022-06-22

## 2022-06-22 RX ADMIN — DIPHENHYDRAMINE HYDROCHLORIDE 25 MG: 25 TABLET ORAL at 00:31

## 2022-06-22 RX ADMIN — IBUPROFEN 600 MG: 600 TABLET, FILM COATED ORAL at 00:31

## 2022-06-22 RX ADMIN — ACETAMINOPHEN 650 MG: 325 TABLET ORAL at 00:32

## 2022-06-22 RX ADMIN — DEXAMETHASONE 8 MG: 4 TABLET ORAL at 00:31

## 2022-06-22 ASSESSMENT — PAIN DESCRIPTION - PAIN TYPE: TYPE: ACUTE PAIN

## 2022-06-22 NOTE — ED TRIAGE NOTES
"Chief Complaint   Patient presents with   • Head Pain     Pain on left side of head and into left eye x1 week after pt states \"I coughed really hard and was trying to throw up\"     Pt ambulatory to triage with mom for above complaint. Pt states that the pain gets worse when he shakes his head and with any fast movements. Pt states he was shaking his head earlier tonight and the pain got much worse and he became dizzy for about an hour afterwards.    Pt is alert/oriented and follows commands. Pt speaking in full sentences and responds appropriately to questions. No acute distress noted in triage and respirations are even and unlabored.     Pt placed in lobby and educated on triage process. Pt and mom encouraged to alert staff for any changes in condition.  "

## 2022-06-22 NOTE — ED PROVIDER NOTES
"CHIEF COMPLAINT  Chief Complaint   Patient presents with   • Head Pain     Pain on left side of head and into left eye x1 week after pt states \"I coughed really hard and was trying to throw up\"       HPI  Danilo Venegas is a 31 y.o. male who presents evaluation of a headache.  Patient stated he was coughing 1 week ago and afterwards developed a headache, which is described as a throbbing sensation located on the left side, he has had some intermittent photophobia and photophobia.  He feels like his left eyeball is \"popping out\" , is concerned he may have damaged his eyes so he came to the emergency department for evaluation.  No known history of aneurysms no family history of aneurysm no numbness tingling weakness in extremities.  No trauma falls or other complaints.    REVIEW OF SYSTEMS  See HPI for further details. All other systems are negative.     PAST MEDICAL HISTORY       SOCIAL HISTORY  Social History     Tobacco Use   • Smoking status: Current Some Day Smoker   • Smokeless tobacco: Never Used   Vaping Use   • Vaping Use: Never used   Substance and Sexual Activity   • Alcohol use: Yes     Comment: \"on the weekends\"   • Drug use: No   • Sexual activity: Yes     Partners: Female     Birth control/protection: Condom       SURGICAL HISTORY   has a past surgical history that includes appendectomy and nasal fracture reduction closed (N/A, 6/9/2015).    CURRENT MEDICATIONS  Home Medications     Reviewed by Danni Gar R.N. (Registered Nurse) on 06/21/22 at 2251  Med List Status: Not Addressed   Medication Last Dose Status   doxycycline (MONODOX) 100 MG capsule  Active   valacyclovir (VALTREX) 1 GM Tab  Active                ALLERGIES  No Known Allergies    FAMILY HISTORY  No pertinent family history    PHYSICAL EXAM   BP (!) 137/97   Pulse 94   Temp 36.5 °C (97.7 °F) (Temporal)   Resp 20   Ht 1.727 m (5' 8\")   Wt 68.3 kg (150 lb 9.2 oz)   SpO2 99%   BMI 22.89 kg/m²  @NICOL[[id number]::@   Pulse " ox interpretation: I interpret this pulse ox as normal.  VITALS - vital signs documented prior to this note have been reviewed and noted,  GENERAL - awake, alert, oriented, GCS 15, no apparent distress, non-toxic  appearing  HEENT - normocephalic, atraumatic, pupils equal, sclera anicteric, mucus  membranes moist  NECK - supple, no meningismus, full active range of motion, trachea midline  CARDIOVASCULAR - regular rate/rhythm, no murmurs/gallops/rubs  PULMONARY - no respiratory distress, speaking in full sentences, clear to  auscultation bilaterally, no wheezing/ronchi/rales, no accessory muscle use  GASTROINTESTINAL - soft, non-tender, non-distended, no rebound, guarding,  or peritonitis  GENITOURINARY - Deferred  NEUROLOGIC - A cranial nerves II through XII are intact pupils are equally round reactive to light upper extremity strength and lower extremity strength is 5 out of 5 sensation is grossly intact in all extremities patellar DTRs are 2+ bilaterally no truncal ataxia normal finger-to-nose no appreciable papilledema on funduscopic exam  MUSCULOSKELETAL - no obvious asymmetry or deformities present  EXTREMITIES - warm, well-perfused, no cyanosis or significant edema  DERMATOLOGIC - warm, dry, no rashes, no jaundice  PSYCHIATRIC - normal affect, normal insight, normal concentration              LABS      Labs Reviewed - No data to display      Pertinent Labs & Imaging studies reviewed. (See chart for details)    RADIOLOGY  No orders to display             ED COURSE/PROCEDURES          Medications   acetaminophen (Tylenol) tablet 650 mg (has no administration in time range)   ketorolac (TORADOL) injection 15 mg (has no administration in time range)   prochlorperazine (COMPAZINE) tablet 5 mg (has no administration in time range)   diphenhydrAMINE (BENADRYL) tablet/capsule 25 mg (has no administration in time range)   dexamethasone (DECADRON) tablet 8 mg (has no administration in time range)               MEDICAL  DECISION MAKING         The patient presented to the emergency department with a headache and concerned that his eye is popping.  Differential initially included was not limited to tension-type headache migraine-type headache cluster type headache normal reassuring neurologic exam no appreciable palpable no temporal tenderness jaw claudication edema, [place] subarachnoid hemorrhage rule negative less likely temporal arteritis pseudotumor cerebri intracranial mass intracranial bleed meningitis encephalitis subarachnoid hemorrhage among other considerations.  No appreciable proptosis on examination a bedside ultrasound was performed of the eye which revealed no evidence of lens dislocation, a retro-orbital hematoma, retinal detachment.  Patient was given Tylenol Motrin and Decadron and Compazine and Benadryl for suspected migraine, after treatment the patient is now resting comfortably and feels better, is alert, talkative, interactive and in no distress. The repeat examination is unremarkable and benign. The patient is neurologically intact, has a normal mental status, and is ambulatory in the ED. The history, exam, and the patient's current condition do not suggest meningitis, stroke, sepsis, subarachnoid hemorrhage, intracranial bleeding, encephalitis, temporal arteritis or other significant pathology to warrant further testing, continued ED treatment, admission, neurological consultation, or other specialist evaluation at this point. The vital signs have been stable. The patient's condition is stable and appropriate for discharge. The patient will pursue further outpatient evaluation with the primary care physician or other designated or consulting physician as indicated in the discharge instructions.            FINAL IMPRESSION  1.  Headache  2.  Elevated blood pressure           Electronically signed by: Bartolome Milner D.O., 6/22/2022 12:18 AM      Dictation Disclaimer  Please note this report has been  produced using speech recognition software and  may contain errors related to that system, including errors seen in grammar,  punctuation and spelling, as well as words and phrases that may be inappropriate.  If there are any questions or concerns, please feel free to contact the dictating  physician for clarification.

## 2022-06-22 NOTE — DISCHARGE INSTRUCTIONS
If your headache returns take Tylenol and ibuprofen, you may take the Compazine as needed for headache or vomiting if you develop any numbness tingling weakness in any extremities, neck stiffness, return to the ER for a recheck you should follow-up with your PCP in regards to your headache if they continue.

## 2023-12-27 ENCOUNTER — HOSPITAL ENCOUNTER (EMERGENCY)
Facility: MEDICAL CENTER | Age: 33
End: 2023-12-27
Payer: COMMERCIAL

## 2023-12-27 VITALS
DIASTOLIC BLOOD PRESSURE: 91 MMHG | TEMPERATURE: 98.2 F | OXYGEN SATURATION: 95 % | HEART RATE: 80 BPM | SYSTOLIC BLOOD PRESSURE: 127 MMHG | RESPIRATION RATE: 18 BRPM

## 2023-12-27 PROCEDURE — 302449 STATCHG TRIAGE ONLY (STATISTIC)

## 2023-12-27 NOTE — ED NOTES
"Patient states \"I want to go home, I'm not feeling that bad. If I feel worse I will come back.\"  AMA form signed.   "

## 2024-02-15 ENCOUNTER — HOSPITAL ENCOUNTER (EMERGENCY)
Facility: MEDICAL CENTER | Age: 34
End: 2024-02-15
Attending: EMERGENCY MEDICINE
Payer: COMMERCIAL

## 2024-02-15 VITALS
BODY MASS INDEX: 23.46 KG/M2 | TEMPERATURE: 98 F | DIASTOLIC BLOOD PRESSURE: 81 MMHG | RESPIRATION RATE: 18 BRPM | HEART RATE: 70 BPM | OXYGEN SATURATION: 96 % | SYSTOLIC BLOOD PRESSURE: 130 MMHG | WEIGHT: 154.32 LBS

## 2024-02-15 DIAGNOSIS — R93.0 ABNORMAL HEAD CT: ICD-10-CM

## 2024-02-15 DIAGNOSIS — R41.840 DIFFICULTY CONCENTRATING: ICD-10-CM

## 2024-02-15 LAB — EKG IMPRESSION: NORMAL

## 2024-02-15 PROCEDURE — 99283 EMERGENCY DEPT VISIT LOW MDM: CPT

## 2024-02-15 PROCEDURE — 93005 ELECTROCARDIOGRAM TRACING: CPT

## 2024-02-15 PROCEDURE — 93005 ELECTROCARDIOGRAM TRACING: CPT | Performed by: EMERGENCY MEDICINE
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
## 2024-02-15 NOTE — DISCHARGE INSTRUCTIONS
It is very important that you follow-up with your primary care provider.  I have placed an outpatient MRI order for you.  I would like you to follow-up and have the scan done within the next few weeks.  I will not be able to relay these results to you following this, and is therefore very important that you get these results and follow-up with your primary care provider.  Call [phone] to schedule your MRI

## 2024-02-15 NOTE — ED TRIAGE NOTES
"Pt ambulatory to triage c/o \"brain fog\" and fatigue with dizziness when standing x months. Nad. vss  "

## 2025-03-14 ENCOUNTER — APPOINTMENT (OUTPATIENT)
Dept: RADIOLOGY | Facility: MEDICAL CENTER | Age: 35
End: 2025-03-14
Attending: STUDENT IN AN ORGANIZED HEALTH CARE EDUCATION/TRAINING PROGRAM
Payer: COMMERCIAL

## 2025-03-14 ENCOUNTER — HOSPITAL ENCOUNTER (EMERGENCY)
Facility: MEDICAL CENTER | Age: 35
End: 2025-03-14
Attending: STUDENT IN AN ORGANIZED HEALTH CARE EDUCATION/TRAINING PROGRAM
Payer: COMMERCIAL

## 2025-03-14 VITALS
RESPIRATION RATE: 18 BRPM | BODY MASS INDEX: 23.72 KG/M2 | TEMPERATURE: 97.2 F | DIASTOLIC BLOOD PRESSURE: 70 MMHG | OXYGEN SATURATION: 96 % | WEIGHT: 156.53 LBS | SYSTOLIC BLOOD PRESSURE: 125 MMHG | HEART RATE: 88 BPM | HEIGHT: 68 IN

## 2025-03-14 DIAGNOSIS — M79.602 LEFT ARM PAIN: ICD-10-CM

## 2025-03-14 PROCEDURE — 73090 X-RAY EXAM OF FOREARM: CPT | Mod: LT

## 2025-03-14 PROCEDURE — 99283 EMERGENCY DEPT VISIT LOW MDM: CPT

## 2025-03-14 PROCEDURE — A9270 NON-COVERED ITEM OR SERVICE: HCPCS | Performed by: STUDENT IN AN ORGANIZED HEALTH CARE EDUCATION/TRAINING PROGRAM

## 2025-03-14 PROCEDURE — 700102 HCHG RX REV CODE 250 W/ 637 OVERRIDE(OP): Performed by: STUDENT IN AN ORGANIZED HEALTH CARE EDUCATION/TRAINING PROGRAM

## 2025-03-14 RX ORDER — ACETAMINOPHEN 500 MG
1000 TABLET ORAL ONCE
Status: COMPLETED | OUTPATIENT
Start: 2025-03-14 | End: 2025-03-14

## 2025-03-14 RX ORDER — IBUPROFEN 600 MG/1
600 TABLET, FILM COATED ORAL ONCE
Status: DISCONTINUED | OUTPATIENT
Start: 2025-03-14 | End: 2025-03-14

## 2025-03-14 RX ORDER — ACETAMINOPHEN 325 MG/1
650 TABLET ORAL ONCE
Status: DISCONTINUED | OUTPATIENT
Start: 2025-03-14 | End: 2025-03-14

## 2025-03-14 RX ORDER — IBUPROFEN 600 MG/1
600 TABLET, FILM COATED ORAL ONCE
Status: COMPLETED | OUTPATIENT
Start: 2025-03-14 | End: 2025-03-14

## 2025-03-14 RX ADMIN — IBUPROFEN 600 MG: 600 TABLET, FILM COATED ORAL at 21:25

## 2025-03-14 RX ADMIN — ACETAMINOPHEN 1000 MG: 500 TABLET ORAL at 21:25

## 2025-03-14 NOTE — Clinical Note
Danilo Venegas was seen and treated in our emergency department on 3/14/2025.  He may return to work on 03/18/2025.       If you have any questions or concerns, please don't hesitate to call.      Ramses Acharya D.O.

## 2025-03-15 NOTE — DISCHARGE INSTRUCTIONS
As we discussed you can use ibuprofen and Tylenol for pain.  Over the next few days you should ice your wrist and avoid strenuous activity.  You should follow-up with your primary care provider.  If you have uncontrolled pain, fever or spreading redness or swelling please return to the Emergency Department.

## 2025-03-15 NOTE — ED PROVIDER NOTES
"ER Provider Note    Scribed for Dr. Ramses Acharya D.O. by Priti Lagos. 3/14/2025  8:43 PM    Primary Care Provider: GAVIN Benoit    CHIEF COMPLAINT  Chief Complaint   Patient presents with    Arm Pain     LUE       EXTERNAL RECORDS REVIEWED  Outpatient Notes Patient was seen on 3/14/2025 with [facility] for prediabetes.     HPI/ROS  LIMITATION TO HISTORY   Select: : None    OUTSIDE HISTORIAN(S):  None     Danilo Venegas is a 34 y.o. male who presents to the ED for left upper arm pain onset yesterday.   The patient notes his left lower arm began to hurt yesterday and when was dressing for work this morning  he noticed a raised large painful bump on the inside of his left lower wrist. He adds it feels as if something \"popped.\" He confirms pain to the left lower arm exacerbated with rotation. He denies any fever. He reports no past medical conditions.     PAST MEDICAL HISTORY  History reviewed. No pertinent past medical history.    SURGICAL HISTORY  Past Surgical History:   Procedure Laterality Date    NASAL FRACTURE REDUCTION CLOSED N/A 6/9/2015    Procedure: NASAL FRACTURE REDUCTION CLOSED;  Surgeon: Cam Saab M.D.;  Location: SURGERY SURGICAL [facility];  Service:     APPENDECTOMY         FAMILY HISTORY  Family History   Family history unknown: Yes       SOCIAL HISTORY   reports that he has been smoking. He has never used smokeless tobacco. He reports current alcohol use. He reports that he does not use drugs.    CURRENT MEDICATIONS  Previous Medications    ACETAMINOPHEN (TYLENOL) 500 MG TAB    Take 1-2 Tablets by mouth every 6 hours as needed for Moderate Pain.    IBUPROFEN (MOTRIN) 600 MG TAB    Take 1 Tablet by mouth every 6 hours as needed for Mild Pain or Moderate Pain.    PROCHLORPERAZINE (COMPAZINE) 10 MG TAB    Take 1 Tablet by mouth every 6 hours as needed for Nausea/Vomiting (or headache).       ALLERGIES  Patient has no known allergies.    PHYSICAL EXAM  BP " "136/79   Pulse 86   Temp 36.2 °C (97.2 °F) (Temporal)   Resp 18   Ht 1.727 m (5' 8\")   Wt 71 kg (156 lb 8.4 oz)   SpO2 96%   BMI 23.80 kg/m²   Constitutional: Alert in no apparent distress.  HENT: No signs of trauma, Bilateral external ears normal, Nose normal.   Eyes: Pupils are equal and reactive, Conjunctiva normal, Non-icteric.   Neck: Normal range of motion, No tenderness, Supple, No stridor.   Cardiovascular: Regular rate and rhythm, no murmurs.   Thorax & Lungs: Normal breath sounds, No respiratory distress, No wheezing, No chest tenderness.   Abdomen: Bowel sounds normal, Soft, No tenderness, No masses, No pulsatile masses.   Skin: Warm, Dry, No erythema, No rash.   Back: No bony tenderness, No CVA tenderness.   Extremities: Intact distal pulses, No edema, No tenderness, No cyanosis, Mild swelling and tenderness over the lateral aspect of the left distal forearm, Normal capillary refill in all five digits, Sensation intact throughout the left arm and hand. Soft compartments throughout the left extremity. Normal range of motion with the left wrist and elbow.   Musculoskeletal: Good range of motion in all major joints. No tenderness to palpation or major deformities noted.   Neurologic: Alert , Normal motor function, Normal sensory function, No focal deficits noted.     DIAGNOSTIC STUDIES & PROCEDURES    Radiology:   The attending Emergency Physician has independently interpreted the diagnostic imaging associated with this visit and is awaiting the final reading from the radiologist, which will be displayed below.    Preliminary interpretation is a follows: No fracture or dislocation of the left forearm  Radiologist interpretation:    DX-FOREARM LEFT   Final Result         1.  No acute traumatic bony injury.   2.  Ulna minus variant           COURSE & MEDICAL DECISION MAKING    INITIAL ASSESSMENT AND PLAN    Care Narrative: Patient's exam is consistent with de Quervain's tenosynovitis.  Patient does not " have clinical signs of DVT, cellulitis, septic arthritis, arterial occlusion.  Patient concerned he may have struck his arm while at work will obtain x-ray to assess for occult fracture.  Otherwise discussed with patient supportive care at home return precautions and outpatient follow-up which he is comfortable with.      8:43 PM - Patient seen and evaluated at bedside.     10:00 PM -x-ray does not show any bony lesions or fracture.  I reevaluated the patient at bedside. I discussed plan for discharge and follow up as outlined below. The patient is stable for discharge at this time and will return for any new or worsening symptoms. Patient verbalizes understanding and support with my plan for discharge.                DISPOSITION AND DISCUSSIONS    I have discussed management of the patient with the following physicians and CAROLINE's: None     Discussion of management with other [facility] or appropriate source(s): None       Barriers to care at this time, including but not limited to:  None noted at this time .       The patient will return for new or worsening symptoms and is stable at the time of discharge.    DISPOSITION:  Patient will be discharged home in stable condition.    FOLLOW UP:  ANTHONY BenoitRCookieN.  [address]  [place] [address]  [phone]    Schedule an appointment as soon as possible for a visit       [facility], Emergency Dept  [address] [address] [phone]  Go to   If symptoms worsen    FINAL IMPRESSION   1. Left arm pain         Priti CARIAS (Elvin), am scribing for, and in the presence of, Ramses Acharya D.O.     Electronically signed by: Priti Gallardo), 3/14/2025    Ramses CARIAS D.O. personally performed the services described in this documentation, as scribed by Priti Lagos in my presence, and it is both accurate and complete.    The note accurately reflects work and decisions made by  me.  Ramses Acharya D.O.  3/14/2025  11:00 PM

## 2025-03-15 NOTE — ED TRIAGE NOTES
Vitals:    03/14/25 1852   BP: 136/79   Pulse: 86   Resp: 18   Temp: 36.2 °C (97.2 °F)   SpO2: 96%     Chief Complaint   Patient presents with    Arm Pain     LUE     Pt has an area on his left wrist that is painful and swollen. He noticed it hurting yesterday and now there is a fluid-filled area. He denies trauma that he can recall.     Pt is ambulatory to and from triage and is alert and oriented x 4.

## 2025-04-27 LAB — EKG IMPRESSION: NORMAL

## 2025-04-27 PROCEDURE — 36415 COLL VENOUS BLD VENIPUNCTURE: CPT

## 2025-04-27 PROCEDURE — 93005 ELECTROCARDIOGRAM TRACING: CPT | Mod: TC | Performed by: EMERGENCY MEDICINE

## 2025-04-27 PROCEDURE — 99283 EMERGENCY DEPT VISIT LOW MDM: CPT

## 2025-04-27 PROCEDURE — 93005 ELECTROCARDIOGRAM TRACING: CPT | Mod: TC

## 2025-04-28 ENCOUNTER — HOSPITAL ENCOUNTER (EMERGENCY)
Facility: MEDICAL CENTER | Age: 35
End: 2025-04-28
Attending: EMERGENCY MEDICINE
Payer: COMMERCIAL

## 2025-04-28 ENCOUNTER — APPOINTMENT (OUTPATIENT)
Dept: RADIOLOGY | Facility: MEDICAL CENTER | Age: 35
End: 2025-04-28
Attending: EMERGENCY MEDICINE
Payer: COMMERCIAL

## 2025-04-28 ENCOUNTER — PHARMACY VISIT (OUTPATIENT)
Dept: PHARMACY | Facility: MEDICAL CENTER | Age: 35
End: 2025-04-28
Payer: COMMERCIAL

## 2025-04-28 VITALS
DIASTOLIC BLOOD PRESSURE: 77 MMHG | HEART RATE: 94 BPM | HEIGHT: 68 IN | BODY MASS INDEX: 23.72 KG/M2 | SYSTOLIC BLOOD PRESSURE: 112 MMHG | OXYGEN SATURATION: 95 % | RESPIRATION RATE: 26 BRPM | TEMPERATURE: 98.1 F | WEIGHT: 156.53 LBS

## 2025-04-28 DIAGNOSIS — R06.02 SHORTNESS OF BREATH: ICD-10-CM

## 2025-04-28 DIAGNOSIS — J02.9 SORE THROAT: ICD-10-CM

## 2025-04-28 DIAGNOSIS — J02.0 STREP THROAT: ICD-10-CM

## 2025-04-28 LAB
ALBUMIN SERPL BCP-MCNC: 3.9 G/DL (ref 3.2–4.9)
ALBUMIN/GLOB SERPL: 1.2 G/DL
ALP SERPL-CCNC: 65 U/L (ref 30–99)
ALT SERPL-CCNC: 18 U/L (ref 2–50)
ANION GAP SERPL CALC-SCNC: 14 MMOL/L (ref 7–16)
AST SERPL-CCNC: 22 U/L (ref 12–45)
BASOPHILS # BLD AUTO: 0.3 % (ref 0–1.8)
BASOPHILS # BLD: 0.02 K/UL (ref 0–0.12)
BILIRUB SERPL-MCNC: 0.3 MG/DL (ref 0.1–1.5)
BUN SERPL-MCNC: 8 MG/DL (ref 8–22)
CALCIUM ALBUM COR SERPL-MCNC: 8.9 MG/DL (ref 8.5–10.5)
CALCIUM SERPL-MCNC: 8.8 MG/DL (ref 8.5–10.5)
CHLORIDE SERPL-SCNC: 107 MMOL/L (ref 96–112)
CO2 SERPL-SCNC: 21 MMOL/L (ref 20–33)
CREAT SERPL-MCNC: 0.83 MG/DL (ref 0.5–1.4)
EOSINOPHIL # BLD AUTO: 0.02 K/UL (ref 0–0.51)
EOSINOPHIL NFR BLD: 0.3 % (ref 0–6.9)
ERYTHROCYTE [DISTWIDTH] IN BLOOD BY AUTOMATED COUNT: 39.7 FL (ref 35.9–50)
FLUAV RNA SPEC QL NAA+PROBE: NEGATIVE
FLUBV RNA SPEC QL NAA+PROBE: NEGATIVE
GFR SERPLBLD CREATININE-BSD FMLA CKD-EPI: 119 ML/MIN/1.73 M 2
GLOBULIN SER CALC-MCNC: 3.2 G/DL (ref 1.9–3.5)
GLUCOSE SERPL-MCNC: 101 MG/DL (ref 65–99)
HCT VFR BLD AUTO: 45.9 % (ref 42–52)
HETEROPH AB SER QL: NEGATIVE
HGB BLD-MCNC: 15.9 G/DL (ref 14–18)
IMM GRANULOCYTES # BLD AUTO: 0.03 K/UL (ref 0–0.11)
IMM GRANULOCYTES NFR BLD AUTO: 0.4 % (ref 0–0.9)
LYMPHOCYTES # BLD AUTO: 1.5 K/UL (ref 1–4.8)
LYMPHOCYTES NFR BLD: 19.1 % (ref 22–41)
MCH RBC QN AUTO: 32.1 PG (ref 27–33)
MCHC RBC AUTO-ENTMCNC: 34.6 G/DL (ref 32.3–36.5)
MCV RBC AUTO: 92.5 FL (ref 81.4–97.8)
MONOCYTES # BLD AUTO: 0.61 K/UL (ref 0–0.85)
MONOCYTES NFR BLD AUTO: 7.8 % (ref 0–13.4)
NEUTROPHILS # BLD AUTO: 5.66 K/UL (ref 1.82–7.42)
NEUTROPHILS NFR BLD: 72.1 % (ref 44–72)
NRBC # BLD AUTO: 0 K/UL
NRBC BLD-RTO: 0 /100 WBC (ref 0–0.2)
PLATELET # BLD AUTO: 230 K/UL (ref 164–446)
PMV BLD AUTO: 8.7 FL (ref 9–12.9)
POTASSIUM SERPL-SCNC: 3.9 MMOL/L (ref 3.6–5.5)
PROT SERPL-MCNC: 7.1 G/DL (ref 6–8.2)
RBC # BLD AUTO: 4.96 M/UL (ref 4.7–6.1)
RSV RNA SPEC QL NAA+PROBE: NEGATIVE
S PYO DNA SPEC NAA+PROBE: DETECTED
SARS-COV-2 RNA RESP QL NAA+PROBE: NOTDETECTED
SODIUM SERPL-SCNC: 142 MMOL/L (ref 135–145)
TROPONIN T SERPL-MCNC: 8 NG/L (ref 6–19)
WBC # BLD AUTO: 7.8 K/UL (ref 4.8–10.8)

## 2025-04-28 PROCEDURE — 80053 COMPREHEN METABOLIC PANEL: CPT

## 2025-04-28 PROCEDURE — 87651 STREP A DNA AMP PROBE: CPT

## 2025-04-28 PROCEDURE — RXMED WILLOW AMBULATORY MEDICATION CHARGE: Performed by: EMERGENCY MEDICINE

## 2025-04-28 PROCEDURE — 71045 X-RAY EXAM CHEST 1 VIEW: CPT

## 2025-04-28 PROCEDURE — 0241U HCHG SARS-COV-2 COVID-19 NFCT DS RESP RNA 4 TRGT ED POC: CPT

## 2025-04-28 PROCEDURE — 85025 COMPLETE CBC W/AUTO DIFF WBC: CPT

## 2025-04-28 PROCEDURE — 84484 ASSAY OF TROPONIN QUANT: CPT

## 2025-04-28 PROCEDURE — 86308 HETEROPHILE ANTIBODY SCREEN: CPT

## 2025-04-28 RX ORDER — AMOXICILLIN 500 MG/1
1000 CAPSULE ORAL DAILY
Qty: 20 CAPSULE | Refills: 0 | Status: ACTIVE | OUTPATIENT
Start: 2025-04-28 | End: 2025-05-08

## 2025-04-28 NOTE — ED NOTES
Discharge education provided by ERP. Discharge paperwork signed by patient. All questions answered. All belongings with patient. Patient ambulated to lobby unassisted with steady gait.

## 2025-04-28 NOTE — ED NOTES
RN reevaluated patient.Pt  continues to hyperventilate. Lungs auscultated clear. Spo2 remains 100% on RA. Attempted to have pt slow respirations.

## 2025-04-28 NOTE — ED TRIAGE NOTES
Pt ambulated into ER with c/o SOB. Pt appears to be hyperventilating. EKG ordered and done in triage. Pt is A&O and ambulatory. Placed in lobby pending ER room.

## 2025-04-28 NOTE — ED PROVIDER NOTES
"                                                        ED Provider Note    CHIEF COMPLAINT  Chief Complaint   Patient presents with    Shortness of Breath        HPI    Primary care provider: CABRERA Mims   History obtained from: Patient  History limited by: None     Danilo Venegas is a 34 y.o. male who presents to the ED with mother and brother complaining of shortness of breath waking him up from his nap shortly prior to arrival.  Patient reports that \"I was freaking out, I was going to lose consciousness.\"  Patient states that he has been sick for the past 4 days with sore throat and cough and runny nose \"here and there.\"  He reports feeling like he had a fever a day ago.  He has had some nausea but no vomiting.  He had diarrhea yesterday but not today.  He reports some headache.  No chest pain or abdominal pain.  He reports that his breathing is improved.  No ill contacts or recent travels.  No known history of heart problems or blood clots or in the family.    REVIEW OF SYSTEMS  Please see HPI for pertinent positives/negatives.  All other systems reviewed and are negative.     PAST MEDICAL HISTORY  No past medical history on file.     SURGICAL HISTORY  Past Surgical History:   Procedure Laterality Date    NASAL FRACTURE REDUCTION CLOSED N/A 6/9/2015    Procedure: NASAL FRACTURE REDUCTION CLOSED;  Surgeon: Cam Saab M.D.;  Location: SURGERY SURG[facility];  Service:     APPENDECTOMY          SOCIAL HISTORY  Social History     Tobacco Use    Smoking status: Some Days    Smokeless tobacco: Never   Vaping Use    Vaping status: Never Used   Substance and Sexual Activity    Alcohol use: Yes     Comment: \"on the weekends\"    Drug use: No    Sexual activity: Yes     Partners: Female     Birth control/protection: Condom        FAMILY HISTORY  Family History   Family history unknown: Yes        CURRENT MEDICATIONS  Home Medications    **Home medications have not yet been reviewed for this " "encounter**          ALLERGIES  No Known Allergies     PHYSICAL EXAM  VITAL SIGNS: /77   Pulse 94   Temp 36.7 °C (98.1 °F) (Oral)   Resp (!) 26   Ht 1.727 m (5' 8\")   Wt 71 kg (156 lb 8.4 oz)   SpO2 95%   BMI 23.80 kg/m²  @NICOL[[id number]::@     Pulse ox interpretation: 100% I interpret this pulse ox as normal     Cardiac monitor interpretation: Sinus rhythm with heart rate in the 90s as interpreted by me.  The patient presented with shortness of breath and cardiac monitor was ordered to monitor for dysrhythmia.    Constitutional: Well developed, well nourished, alert in no apparent distress, nontoxic appearance    HENT: No external signs of trauma, normocephalic, oropharynx moist and clear, no trismus/swelling/stridor, airway is widely patent and patient speaking with normal voice without difficulty  Eyes: PERRL, conjunctiva without erythema, no discharge, no icterus    Neck: Soft and supple, trachea midline, no stridor, no tenderness/fullness/crepitus, no LAD, good ROM    Cardiovascular: Regular rate and rhythm, no murmurs/rubs/gallops, strong distal pulses and good perfusion    Thorax & Lungs: No respiratory distress, CTAB    Abdomen: Soft, nontender, nondistended, no guarding, no rebound, normal BS    Extremities: No cyanosis, no edema, no gross deformity, intact distal pulses with brisk cap refill    Skin: Warm, dry, no pallor/cyanosis, no rash noted      Neuro: A/O times 3, no focal deficits noted    Psychiatric: Cooperative, slightly anxious      DIAGNOSTIC STUDIES / PROCEDURES    EKG  12 Lead EKG obtained at 2317 and interpreted by me:   Rate: 86   Rhythm: Sinus rhythm   Ectopy: None  Intervals: Normal   Axis: Normal   QRS: Normal   ST segments: Normal  T Waves: Normal    Clinical Impression: Sinus rhythm without acute ischemic changes or dysrhythmia  Compared to February 15, 2024 without significant change      LABS  All labs reviewed by me.     Results for orders placed or performed during the " hospital encounter of 25   EKG    Collection Time: 25 11:17 PM   Result Value Ref Range    Report       [facility] Emergency Dept.    Test Date:  2025  Pt Name:    RICHARDSON EDDY         Department: ER  MRN:        [id number]                      Room:  Gender:     Male                         Technician: [id number]  :        1990                   Requested By:ER TRIAGE PROTOCOL  Order #:    [id number]                    Reading MD:    Measurements  Intervals                                Axis  Rate:       86                           P:          80  WV:         161                          QRS:        70  QRSD:       97                           T:          59  QT:         348  QTc:        417    Interpretive Statements  Sinus rhythm  ST elev, probable normal early repol pattern  Compared to ECG 02/15/2024 12:43:41  Possible ischemia no longer present  ST (T wave) deviation still present     POC CoV-2, FLU A/B, RSV by PCR    Collection Time: 25 12:18 AM   Result Value Ref Range    POC Influenza A RNA, PCR Negative Negative    POC Influenza B RNA, PCR Negative Negative    POC RSV, by PCR Negative Negative    POC SARS-CoV-2, PCR NotDetected NotDetected   CBC WITH DIFFERENTIAL    Collection Time: 25 12:25 AM   Result Value Ref Range    WBC 7.8 4.8 - 10.8 K/uL    RBC 4.96 4.70 - 6.10 M/uL    Hemoglobin 15.9 14.0 - 18.0 g/dL    Hematocrit 45.9 42.0 - 52.0 %    MCV 92.5 81.4 - 97.8 fL    MCH 32.1 27.0 - 33.0 pg    MCHC 34.6 32.3 - 36.5 g/dL    RDW 39.7 35.9 - 50.0 fL    Platelet Count 230 164 - 446 K/uL    MPV 8.7 (L) 9.0 - 12.9 fL    Neutrophils-Polys 72.10 (H) 44.00 - 72.00 %    Lymphocytes 19.10 (L) 22.00 - 41.00 %    Monocytes 7.80 0.00 - 13.40 %    Eosinophils 0.30 0.00 - 6.90 %    Basophils 0.30 0.00 - 1.80 %    Immature Granulocytes 0.40 0.00 - 0.90 %    Nucleated RBC 0.00 0.00 - 0.20 /100 WBC    Neutrophils (Absolute) 5.66 1.82 - 7.42 K/uL    Lymphs  (Absolute) 1.50 1.00 - 4.80 K/uL    Monos (Absolute) 0.61 0.00 - 0.85 K/uL    Eos (Absolute) 0.02 0.00 - 0.51 K/uL    Baso (Absolute) 0.02 0.00 - 0.12 K/uL    Immature Granulocytes (abs) 0.03 0.00 - 0.11 K/uL    NRBC (Absolute) 0.00 K/uL   CMP    Collection Time: 04/28/25 12:25 AM   Result Value Ref Range    Sodium 142 135 - 145 mmol/L    Potassium 3.9 3.6 - 5.5 mmol/L    Chloride 107 96 - 112 mmol/L    Co2 21 20 - 33 mmol/L    Anion Gap 14.0 7.0 - 16.0    Glucose 101 (H) 65 - 99 mg/dL    Bun 8 8 - 22 mg/dL    Creatinine 0.83 0.50 - 1.40 mg/dL    Calcium 8.8 8.5 - 10.5 mg/dL    Correct Calcium 8.9 8.5 - 10.5 mg/dL    AST(SGOT) 22 12 - 45 U/L    ALT(SGPT) 18 2 - 50 U/L    Alkaline Phosphatase 65 30 - 99 U/L    Total Bilirubin 0.3 0.1 - 1.5 mg/dL    Albumin 3.9 3.2 - 4.9 g/dL    Total Protein 7.1 6.0 - 8.2 g/dL    Globulin 3.2 1.9 - 3.5 g/dL    A-G Ratio 1.2 g/dL   ESTIMATED GFR    Collection Time: 04/28/25 12:25 AM   Result Value Ref Range    GFR (CKD-EPI) 119 >60 mL/min/1.73 m 2   Group A Strep by PCR    Collection Time: 04/28/25  1:29 AM    Specimen: Throat   Result Value Ref Range    Group A Strep by PCR DETECTED (A) Not Detected   TROPONIN    Collection Time: 04/28/25  1:29 AM   Result Value Ref Range    Troponin T 8 6 - 19 ng/L   MONONUCLEOSIS TEST QUAL    Collection Time: 04/28/25  1:29 AM   Result Value Ref Range    Heterophile Screen Negative Negative        RADIOLOGY  I have independently interpreted the diagnostic imaging associated with this visit and am waiting the final reading from the radiologist.   My preliminary interpretation is as follows: No acute findings on portable chest x-ray.    DX-CHEST-PORTABLE (1 VIEW)   Final Result      No acute cardiac or pulmonary abnormalities are identified.             COURSE & MEDICAL DECISION MAKING  Nursing notes, VS, PMSFHx reviewed in chart.     Review of past medical records shows the patient was last seen in this ED March 14, 2025 with diagnosis of left arm  pain.  Patient had outpatient visit on February 28, 2025.  He was seen at [facility] ED February 18, 2024 with diagnosis of postconcussive syndrome, abnormal CT of brain, alcohol use disorder.      Differential diagnoses considered include but are not limited to: AMI, pericardial effusion/tamponade, pericarditis, CHF/pulm edema, PE, pneumothorax, pneumonia, pleural effusion, asthma, bronchospasm, bronchitis, respiratory infection, anxiety/hyperventilation, URI, influenza, COVID, pharyngitis/tonsillitis, epiglottitis, peritonsillar abscess, retropharyngeal abscess, mononucleosis, viral syndrome       ED Observation Status? No; Patient does not meet criteria for ED Observation.       Discussion of management with other QHP or appropriate source(s): None     Escalation of care considered, and ultimately not performed: acute inpatient care management, however at this time, the patient is most appropriate for outpatient management.     Decision tools and prescription drugs considered including, but not limited to: Antibiotics   and Pain Medications   .        The patient was ruled out for PE by PERC criteria:    Age < 50  HR < 100  RA sat > 94%  No hx of DVT/PE  No hemoptysis  No recent surgery/trauma (4 weeks)  No estrogen/BCP  No unilateral leg swelling        Pt risk-stratified as low risk for MACE in the next 6 weeks by HEART Score (0-3): 1    HISTORY  Highly suspicious +2  Moderately suspicious +1  Slightly suspicious 0    EKG  Significant ST depression +2  Nonspecific repolarization disturbance +1  Normal 0    AGE  >= 65 +2  45-65 +1  < 45 0    RISK FACTORS  Hypercholesterolemia, HTN, DM, Cigarette smoking, positive family history, obesity  >= 3 risk factors or history of atherosclerotic disease +2  1-2 risk factors +1  No risk factors known 0    TROPONIN  >= 3× normal limit +2  1-3× normal limit +1  <= normal limit 0      History and physical exam as above.  This is a generally healthy 34-year-old male patient  with history of alcohol use disorder who presents with family to the ED with above complaints.  EKG was obtained by triage protocol without acute findings and troponin without elevation.  I doubt that this is ACS.  Unlikely to be PE using PERC criteria.  At this time, I also have low clinical suspicion for other concerning pathology such as dissection, tamponade, myocarditis, heart failure.  Chest x-ray without acute findings.  Patient is positive for group A strep.  Influenza/RSV/COVID testing returned negative.  No leukocytosis or fever.  Patient was monitored in the ED and remained clinically stable.  He tolerated oral intake without difficulty.  I discussed the findings with patient and family.  On recheck, he is noted to be in no acute distress and nontoxic in appearance.  At this time, I have low clinical suspicion for concerning pathology such as sepsis, meningitis, pharyngeal abscess, epiglottitis, bacterial tracheitis or pneumonia.  Patient will be prescribed a course of amoxicillin.  I discussed with him supportive home care, outpatient follow-up and return to ED precautions.  He verbalized understanding and agreed with plan of care with no further questions or concerns      The patient is referred to a primary physician for blood pressure management, diabetic screening, and for all other preventative health concerns.       FINAL IMPRESSION  1. Shortness of breath Acute   2. Sore throat Acute   3. Strep throat Active          DISPOSITION  Patient will be discharged home in stable condition.       FOLLOW UP  Brandon Domingo, P.A.  [address] [facility] [id number] [phone]    Call today      [facility], Emergency Dept  [address] [facility] [id number] [phone]    If symptoms worsen         OUTPATIENT MEDICATIONS  Discharge Medication List as of 4/28/2025  2:28 AM        START taking these medications    Details   amoxicillin (AMOXIL) 500 MG Cap Take 2 Capsules by  mouth every day for 10 days., Disp-20 Capsule, R-0, Normal                Electronically signed by: Denny Villarreal D.O., 4/28/2025 12:58 AM      Portions of this record were made with voice recognition software.  Despite my review, errors may remain.  Please interpret this chart in the appropriate context.

## 2025-06-26 ENCOUNTER — HOSPITAL ENCOUNTER (EMERGENCY)
Facility: MEDICAL CENTER | Age: 35
End: 2025-06-26
Attending: EMERGENCY MEDICINE
Payer: COMMERCIAL

## 2025-06-26 VITALS
SYSTOLIC BLOOD PRESSURE: 136 MMHG | HEART RATE: 77 BPM | WEIGHT: 160.5 LBS | RESPIRATION RATE: 15 BRPM | TEMPERATURE: 97.5 F | HEIGHT: 68 IN | BODY MASS INDEX: 24.32 KG/M2 | DIASTOLIC BLOOD PRESSURE: 95 MMHG | OXYGEN SATURATION: 93 %

## 2025-06-26 DIAGNOSIS — M65.4 DE QUERVAIN'S TENOSYNOVITIS, LEFT: Primary | ICD-10-CM

## 2025-06-26 PROCEDURE — 99283 EMERGENCY DEPT VISIT LOW MDM: CPT

## 2025-06-26 PROCEDURE — A9270 NON-COVERED ITEM OR SERVICE: HCPCS | Performed by: EMERGENCY MEDICINE

## 2025-06-26 PROCEDURE — 700102 HCHG RX REV CODE 250 W/ 637 OVERRIDE(OP): Performed by: EMERGENCY MEDICINE

## 2025-06-26 RX ORDER — IBUPROFEN 800 MG/1
800 TABLET, FILM COATED ORAL ONCE
Status: COMPLETED | OUTPATIENT
Start: 2025-06-26 | End: 2025-06-26

## 2025-06-26 RX ADMIN — IBUPROFEN 800 MG: 800 TABLET, FILM COATED ORAL at 22:15

## 2025-06-26 ASSESSMENT — FIBROSIS 4 INDEX: FIB4 SCORE: 0.72

## 2025-06-26 ASSESSMENT — PAIN DESCRIPTION - PAIN TYPE: TYPE: ACUTE PAIN

## 2025-06-26 NOTE — Clinical Note
Danilo Venegas was seen and treated in our emergency department on 6/26/2025.  He may return to work on 06/27/2025.  I recommend limited weight bearing less than 5 pounds to the left wrist for at least two weeks and follow up with orthopedics      If you have any questions or concerns, please don't hesitate to call.      Saima Mays M.D.

## 2025-06-27 NOTE — ED NOTES
Discharge paperwork reviewed with patient at chairside. Patient verbalized understanding and asked any remaining questions. Patient able to ambulate without assistance and with steady gait to front lobby.

## 2025-06-27 NOTE — ED PROVIDER NOTES
"ED Provider Note    CHIEF COMPLAINT  Chief Complaint   Patient presents with    Arm Pain     LUE       HPI/ROS  LIMITATION TO HISTORY   Select: : None  OUTSIDE HISTORIAN(S):  ede Venegas is a 34 y.o. male who presents to the emergency department chief complaint of left wrist pain.  He states that he was bending a lot of wires for his work a few months ago when he had this swelling in his forearm and pain there and it slowly got better he was told to rest and ice it but he never really did.  And then the last day or so at work he was pulling wire in that same pain in the wrist leading to the thumb came back again.  He is right-hand dominant but states that he was having pain with movement today and he needs his hands for work and so wanted to get it checked out officially.  Denies any fall has not taken anything for pain.    PAST MEDICAL HISTORY       SURGICAL HISTORY   has a past surgical history that includes appendectomy and nasal fracture reduction closed (N/A, 6/9/2015).    FAMILY HISTORY  Family History   Family history unknown: Yes       SOCIAL HISTORY  Social History     Tobacco Use    Smoking status: Some Days    Smokeless tobacco: Never   Vaping Use    Vaping status: Never Used   Substance and Sexual Activity    Alcohol use: Yes     Comment: \"on the weekends\"    Drug use: No    Sexual activity: Yes     Partners: Female     Birth control/protection: Condom       CURRENT MEDICATIONS  Home Medications       Reviewed by Merle Mulligan R.N. (Registered Nurse) on 06/26/25 at 2101  Med List Status: Not Addressed     Medication Last Dose Status   acetaminophen (TYLENOL) 500 MG Tab  Active   ibuprofen (MOTRIN) 600 MG Tab  Active   prochlorperazine (COMPAZINE) 10 MG Tab  Active                    ALLERGIES  Allergies[1]    PHYSICAL EXAM  VITAL SIGNS: BP (!) 136/95   Pulse 77   Temp 36.4 °C (97.5 °F) (Temporal)   Resp 15   Ht 1.727 m (5' 8\")   Wt 72.8 kg (160 lb 7.9 oz)   SpO2 93%   " BMI 24.40 kg/m²    Pulse OX: Pulse Oxygen level is within normal limits on room air  Constitutional: Alert in no apparent distress.  Eyes: PERound. Conjunctiva normal, non-icteric.   EXT/Back left upper extremity there is tenderness with ulnar deviation along the extensor tendon of the thumb that leads to the thumb no snuffbox tenderness mild swelling at the distal forearm kind of over that same tendon without erythema warmth or induration sensation intact to light touch distally  Skin: Warm, Dry, No erythema, No rash.   Neurologic: Alert and oriented, Grossly non-focal.         COURSE & MEDICAL DECISION MAKING    ASSESSMENT, COURSE AND PLAN/  DISPOSITION AND DISCUSSIONS  Care Narrative:     Patient is a 34-year-old male likely with a de Quervain's tenosynovitis especially with tenderness on palpation and movement especially with ulnar deviation and stretching of that tendon that leads to the thumb.  Otherwise no signs of trauma we discussed RICE treatment NSAIDs ice packs at home and return precautions for any new or worsening issues.      I have discussed management of the patient with the following physicians and CAROLINE's:  none    Discussion of management with other QHP or appropriate source(s): None     Escalation of care considered, and ultimately not performed:diagnostic imaging    Barriers to care at this time, including but not limited to: Patient does not have established PCP.     Decision tools and prescription drugs considered including, but not limited to: Pain Medications otc meds.    The patient will return for new or worsening symptoms and is stable at the time of discharge.    The patient is referred to a primary physician for blood pressure management, diabetic screening, and for all other preventative health concerns.    DISPOSITION:  Patient will be discharged home in stable condition.    FOLLOW UP:  Leonides Baker M.D.  [address]  [address]  [place]  [id number]  [phone]    Schedule an appointment as soon as possible for a visit         OUTPATIENT MEDICATIONS:  Discharge Medication List as of 6/26/2025 10:25 PM            FINAL DIAGNOSIS  1. De Quervain's tenosynovitis, left         Electronically signed by: Saima Mays M.D., 6/26/2025 9:43 PM           [1] No Known Allergies

## 2025-06-27 NOTE — ED NOTES
Left wrist wrapped with elastic bandage. Patient's wrist in comfortable position. Pulse and circulation present.

## 2025-06-27 NOTE — ED TRIAGE NOTES
Vitals:    06/26/25 2104   BP: (!) 147/104   Pulse: 88   Resp: 14   Temp: 37 °C (98.6 °F)   SpO2: 97%     Chief Complaint   Patient presents with    Arm Pain     LUE     Pt reports back in March at work he was bending a lot of copper wire and thinks he sprained his wrist. It was getting better but then he was pulling wire the other day and it has begun hurting a lot again.     Pt is ambulatory to and from triage and is alert and oriented x 4.

## 2025-07-06 ENCOUNTER — HOSPITAL ENCOUNTER (EMERGENCY)
Facility: MEDICAL CENTER | Age: 35
End: 2025-07-06
Attending: EMERGENCY MEDICINE
Payer: COMMERCIAL

## 2025-07-06 VITALS
OXYGEN SATURATION: 97 % | RESPIRATION RATE: 18 BRPM | DIASTOLIC BLOOD PRESSURE: 83 MMHG | HEIGHT: 68 IN | WEIGHT: 161.6 LBS | HEART RATE: 70 BPM | BODY MASS INDEX: 24.49 KG/M2 | TEMPERATURE: 97.6 F | SYSTOLIC BLOOD PRESSURE: 153 MMHG

## 2025-07-06 DIAGNOSIS — R25.2 HAND OR FOOT SPASMS: Primary | ICD-10-CM

## 2025-07-06 DIAGNOSIS — R42 LIGHTHEADEDNESS: ICD-10-CM

## 2025-07-06 DIAGNOSIS — F10.90 ALCOHOL USE: ICD-10-CM

## 2025-07-06 LAB
ALBUMIN SERPL BCP-MCNC: 4.5 G/DL (ref 3.2–4.9)
ALBUMIN/GLOB SERPL: 1.7 G/DL
ALP SERPL-CCNC: 68 U/L (ref 30–99)
ALT SERPL-CCNC: 24 U/L (ref 2–50)
ANION GAP SERPL CALC-SCNC: 15 MMOL/L (ref 7–16)
AST SERPL-CCNC: 24 U/L (ref 12–45)
BASOPHILS # BLD AUTO: 0.4 % (ref 0–1.8)
BASOPHILS # BLD: 0.04 K/UL (ref 0–0.12)
BILIRUB SERPL-MCNC: 0.4 MG/DL (ref 0.1–1.5)
BUN SERPL-MCNC: 8 MG/DL (ref 8–22)
CALCIUM ALBUM COR SERPL-MCNC: 9 MG/DL (ref 8.5–10.5)
CALCIUM SERPL-MCNC: 9.4 MG/DL (ref 8.5–10.5)
CHLORIDE SERPL-SCNC: 98 MMOL/L (ref 96–112)
CO2 SERPL-SCNC: 22 MMOL/L (ref 20–33)
CREAT SERPL-MCNC: 0.84 MG/DL (ref 0.5–1.4)
EKG IMPRESSION: NORMAL
EOSINOPHIL # BLD AUTO: 0.04 K/UL (ref 0–0.51)
EOSINOPHIL NFR BLD: 0.4 % (ref 0–6.9)
ERYTHROCYTE [DISTWIDTH] IN BLOOD BY AUTOMATED COUNT: 40.9 FL (ref 35.9–50)
GFR SERPLBLD CREATININE-BSD FMLA CKD-EPI: 118 ML/MIN/1.73 M 2
GLOBULIN SER CALC-MCNC: 2.6 G/DL (ref 1.9–3.5)
GLUCOSE BLD STRIP.AUTO-MCNC: 158 MG/DL (ref 65–99)
GLUCOSE SERPL-MCNC: 115 MG/DL (ref 65–99)
HCT VFR BLD AUTO: 46.9 % (ref 42–52)
HGB BLD-MCNC: 16.9 G/DL (ref 14–18)
IMM GRANULOCYTES # BLD AUTO: 0.04 K/UL (ref 0–0.11)
IMM GRANULOCYTES NFR BLD AUTO: 0.4 % (ref 0–0.9)
LIPASE SERPL-CCNC: 41 U/L (ref 11–82)
LYMPHOCYTES # BLD AUTO: 1.68 K/UL (ref 1–4.8)
LYMPHOCYTES NFR BLD: 18.4 % (ref 22–41)
MCH RBC QN AUTO: 32.5 PG (ref 27–33)
MCHC RBC AUTO-ENTMCNC: 36 G/DL (ref 32.3–36.5)
MCV RBC AUTO: 90.2 FL (ref 81.4–97.8)
MONOCYTES # BLD AUTO: 0.66 K/UL (ref 0–0.85)
MONOCYTES NFR BLD AUTO: 7.2 % (ref 0–13.4)
NEUTROPHILS # BLD AUTO: 6.67 K/UL (ref 1.82–7.42)
NEUTROPHILS NFR BLD: 73.2 % (ref 44–72)
NRBC # BLD AUTO: 0 K/UL
NRBC BLD-RTO: 0 /100 WBC (ref 0–0.2)
PLATELET # BLD AUTO: 241 K/UL (ref 164–446)
PMV BLD AUTO: 8.9 FL (ref 9–12.9)
POTASSIUM SERPL-SCNC: 3.9 MMOL/L (ref 3.6–5.5)
PROT SERPL-MCNC: 7.1 G/DL (ref 6–8.2)
RBC # BLD AUTO: 5.2 M/UL (ref 4.7–6.1)
SODIUM SERPL-SCNC: 135 MMOL/L (ref 135–145)
WBC # BLD AUTO: 9.1 K/UL (ref 4.8–10.8)

## 2025-07-06 PROCEDURE — 93005 ELECTROCARDIOGRAM TRACING: CPT | Mod: TC | Performed by: EMERGENCY MEDICINE

## 2025-07-06 PROCEDURE — 80053 COMPREHEN METABOLIC PANEL: CPT

## 2025-07-06 PROCEDURE — 83690 ASSAY OF LIPASE: CPT

## 2025-07-06 PROCEDURE — 93005 ELECTROCARDIOGRAM TRACING: CPT | Mod: TC

## 2025-07-06 PROCEDURE — 85025 COMPLETE CBC W/AUTO DIFF WBC: CPT

## 2025-07-06 PROCEDURE — 36415 COLL VENOUS BLD VENIPUNCTURE: CPT

## 2025-07-06 PROCEDURE — 99283 EMERGENCY DEPT VISIT LOW MDM: CPT

## 2025-07-06 PROCEDURE — 82962 GLUCOSE BLOOD TEST: CPT | Performed by: EMERGENCY MEDICINE

## 2025-07-06 ASSESSMENT — FIBROSIS 4 INDEX: FIB4 SCORE: 0.72

## 2025-07-06 NOTE — ED PROVIDER NOTES
"ED Provider Note    CHIEF COMPLAINT  Chief Complaint   Patient presents with    Weakness    Dizziness       EXTERNAL RECORDS REVIEWED  ER note from 6/26/2025 when the patient was seen for some arm discomfort.    HPI/ROS  LIMITATION TO HISTORY   Select: : None  OUTSIDE HISTORIAN(S):  Friend at bedside    Danilo Venegas is a 34 y.o. male who presents 0 with some concerns following some drinking events over the weekend where he was having some odd spasms and irritations in his forearms.  Sometimes happen when he is eating and drinking and once happened while he was driving over the weekend.  He has not had issues like this before and describes a whole separate scenario where he did felt somewhat weak and feeling lightheaded and was worried that he would pass out.  This was not associated with chest discomfort, no shortness of breath and he describes no exertional discomfort.  He is worried that he was panicking and this was a symptom of that.  He has no familial history of early onset cardiac disease no history of sudden cardiac death.  He denies any active chest discomfort, says that he has had some indigestion with drinking though this is not currently active.  He is primarily concerned about possibly having diabetes as this is run in his family before.    PAST MEDICAL HISTORY   none    SURGICAL HISTORY   has a past surgical history that includes appendectomy and nasal fracture reduction closed (N/A, 6/9/2015).    FAMILY HISTORY  Family History   Family history unknown: Yes     SOCIAL HISTORY  Social History     Tobacco Use    Smoking status: Some Days    Smokeless tobacco: Never   Vaping Use    Vaping status: Never Used   Substance and Sexual Activity    Alcohol use: Yes     Comment: \"on the weekends\"    Drug use: No    Sexual activity: Yes     Partners: Female     Birth control/protection: Condom     CURRENT MEDICATIONS  Home Medications       Reviewed by Lelia Kyle R.N. (Registered Nurse) on " "25 at 1429  Med List Status: Complete     Medication Last Dose Status        Patient Arun Taking any Medications                         ALLERGIES  Allergies[1]    PHYSICAL EXAM  VITAL SIGNS: /72   Pulse 81   Temp 37.1 °C (98.8 °F) (Temporal)   Resp 20   Ht 1.727 m (5' 8\")   Wt 73.3 kg (161 lb 9.6 oz)   SpO2 96%   BMI 24.57 kg/m²    Genl: M sitting in chair comfortably, speaking clearly, appears in no acute distress   Head: NC/AT   ENT: Mucous membranes moist, posterior pharynx clear, uvula midline, nares patent bilaterally   Eyes: Normal sclera, pupils equal round reactive to light  Neck: Supple, FROM, no LAD appreciated   Pulmonary: Lungs are clear to auscultation bilaterally  Chest: No TTP  CV:  RRR, no murmur appreciated, pulses 2+ in both upper and lower extremities,  Abdomen: soft, NT/ND; no rebound/guarding  Musculoskeletal: Pain free ROM of the neck. Moving upper and lower extremities in spontaneous and coordinated fashion  Neuro: A&Ox4 (person, place, time, situation), speech fluent, gait steady, no focal deficits appreciated  Skin: No rash or lesions.  No pallor or jaundice.  No cyanosis.  Warm and dry.     EKG/LABS  Results for orders placed or performed during the hospital encounter of 25   EKG   Result Value Ref Range    Report       [facility] Emergency Dept.    Test Date:  2025  Pt Name:    RICHARDSON EDDY         Department: ER  MRN:        [id number]                      Room:  Gender:     Male                         Technician: [id number]  :        1990                   Requested By:ER TRIAGE PROTOCOL  Order #:    [id number]                    Reading MD: Brendan Duarte MD    Measurements  Intervals                                Axis  Rate:       90                           P:          0  ND:         0                            QRS:        64  QRSD:       104                          T:          39  QT:         358  QTc:        " 438    Interpretive Statements  Sinus rhythm at a rate of 98, respiratory variation is noted, no acute ST  segment elevations or depressions, normal axis and intervals.  Compared to ECG 04/27/2025 23:17:20  ST elev, probable normal early repol pattern  Electronically Signed On 07- 15:51:42 PDT by Brendan Duarte MD     I have independently interpreted this EKG    Labs Reviewed   CBC WITH DIFFERENTIAL - Abnormal; Notable for the following components:       Result Value    MPV 8.9 (*)     Neutrophils-Polys 73.20 (*)     Lymphocytes 18.40 (*)     All other components within normal limits   COMP METABOLIC PANEL - Abnormal; Notable for the following components:    Glucose 115 (*)     All other components within normal limits   LIPASE   ESTIMATED GFR   URINALYSIS   URINE DRUG SCREEN     RADIOLOGY/PROCEDURES   none    COURSE & MEDICAL DECISION MAKING    ASSESSMENT, COURSE AND PLAN  Care Narrative: Patient is seen evaluated for symptoms as described above.  The patient reports that he had had some frequent drinking over the weekend and has not been feeling himself was reported spasms in his hands and had some episodes of lightheadedness.  He has some indigestion and irritation in his nose performing the exam but was declining any medications as he said that he was feeling okay.  He is downplaying the amount of drinking that he had this weekend, he is denying any regular drinking episodes or signs of withdrawal.  He had lab work done in triage that showed no white cell elevations, no anemia, no evidence of hepatobiliary obstruction or pancreatitis and glucose is normal with a normal anion gap and bicarb.  We have talked about carpopedal spasms, relationship to stress, dehydration, dietary influences and electrolyte imbalances.  He has had lightheadedness only in situational events involving the spasms and with some element of hyperventilation and I do not appreciate any exertional symptomology or concerning cardiovascular  risk factors.  His EKG here in the emergency department shows sinus rhythm with no signs of ectopy or accessory pathways and overall the patient is otherwise well-appearing.  He has a primary care doctor to follow-up with and we have talked about reporting the amount of symptoms he had, following up with his in a regular fashion and ultimately we would want him to get repeat testing and possible Holter monitor testing if his symptoms continue.  Questions are addressed and he is discharged home in stable condition.    DISPOSITION AND DISCUSSIONS  I have discussed management of the patient with the following physicians and CAROLINE's:  none    Discussion of management with other QHP or appropriate source(s): None     Escalation of care considered, and ultimately not performed:diagnostic imaging    Barriers to care at this time, including but not limited to: none.     Decision tools and prescription drugs considered including, but not limited to: none.    FINAL DIAGNOSIS  1. Hand or foot spasms    2. Lightheadedness      Electronically signed by: Gerald Cardona M.D., 7/6/2025 3:30 PM       [1] No Known Allergies

## 2025-07-06 NOTE — ED NOTES
Per Dr Cardona no UA needed. Vs stable. No acute distress. Pt has a ride home. Pt verbalizes discharge instructions. Pt ready for dc.

## 2025-07-06 NOTE — ED NOTES
Pt ambulated to room 58 with steady gait. Pt placed on monitors. Chart up for ERP. Pt endorses heavy ETOH use last night.

## 2025-07-06 NOTE — ED NOTES
Pt reports he has been drinking heavy this weekend and has felt his hands and feet go numb and he wants to be checked for diabetes. Pt reports las drink was last night.

## 2025-07-06 NOTE — ED TRIAGE NOTES
Patient to ED with complaints weakness, dizziness, shortness of breath, fatigue started this am. No medical hx.     Pt educated on ED process and asked to wait in lobby. Patient educated on importance of alerting staff to new or worsening symptoms or concerns.